# Patient Record
Sex: FEMALE | Race: WHITE | NOT HISPANIC OR LATINO | Employment: UNEMPLOYED | URBAN - METROPOLITAN AREA
[De-identification: names, ages, dates, MRNs, and addresses within clinical notes are randomized per-mention and may not be internally consistent; named-entity substitution may affect disease eponyms.]

---

## 2017-03-06 ENCOUNTER — HISTORICAL (OUTPATIENT)
Dept: LAB | Facility: HOSPITAL | Age: 37
End: 2017-03-06

## 2017-03-09 ENCOUNTER — HISTORICAL (OUTPATIENT)
Dept: RADIOLOGY | Facility: HOSPITAL | Age: 37
End: 2017-03-09

## 2017-03-13 ENCOUNTER — HISTORICAL (OUTPATIENT)
Dept: LAB | Facility: HOSPITAL | Age: 37
End: 2017-03-13

## 2017-09-13 ENCOUNTER — HISTORICAL (OUTPATIENT)
Dept: RADIOLOGY | Facility: HOSPITAL | Age: 37
End: 2017-09-13

## 2018-02-07 ENCOUNTER — HISTORICAL (OUTPATIENT)
Dept: LAB | Facility: HOSPITAL | Age: 38
End: 2018-02-07

## 2018-02-07 LAB
EST. AVERAGE GLUCOSE BLD GHB EST-MCNC: 134 MG/DL
HBA1C MFR BLD: 6.3 % (ref 4.5–6.2)

## 2018-05-09 ENCOUNTER — HISTORICAL (OUTPATIENT)
Dept: LAB | Facility: HOSPITAL | Age: 38
End: 2018-05-09

## 2018-05-09 LAB
EST. AVERAGE GLUCOSE BLD GHB EST-MCNC: 131 MG/DL
HBA1C MFR BLD: 6.2 % (ref 4.5–6.2)

## 2018-08-08 ENCOUNTER — HISTORICAL (OUTPATIENT)
Dept: LAB | Facility: HOSPITAL | Age: 38
End: 2018-08-08

## 2018-08-08 LAB
EST. AVERAGE GLUCOSE BLD GHB EST-MCNC: 120 MG/DL
HBA1C MFR BLD: 5.8 % (ref 4.5–6.2)

## 2020-02-18 ENCOUNTER — HISTORICAL (OUTPATIENT)
Dept: LAB | Facility: HOSPITAL | Age: 40
End: 2020-02-18

## 2020-02-18 LAB
ABS NEUT (OLG): 4.19 X10(3)/MCL (ref 2.1–9.2)
ALBUMIN SERPL-MCNC: 3.7 GM/DL (ref 3.4–5)
ALBUMIN/GLOB SERPL: 1.1 RATIO (ref 1.1–2)
ALP SERPL-CCNC: 75 UNIT/L (ref 46–116)
ALT SERPL-CCNC: 30 UNIT/L (ref 12–78)
AST SERPL-CCNC: 20 UNIT/L (ref 15–37)
BASOPHILS # BLD AUTO: 0 X10(3)/MCL (ref 0–0.2)
BASOPHILS NFR BLD AUTO: 1 %
BILIRUB SERPL-MCNC: 0.3 MG/DL (ref 0.2–1)
BILIRUBIN DIRECT+TOT PNL SERPL-MCNC: 0.11 MG/DL (ref 0–0.2)
BILIRUBIN DIRECT+TOT PNL SERPL-MCNC: 0.19 MG/DL (ref 0–0.8)
BUN SERPL-MCNC: 19 MG/DL (ref 7–18)
CALCIUM SERPL-MCNC: 9.1 MG/DL (ref 8.5–10.1)
CHLORIDE SERPL-SCNC: 101 MMOL/L (ref 98–107)
CHOLEST SERPL-MCNC: 199 MG/DL (ref 0–200)
CHOLEST/HDLC SERPL: 5.4 {RATIO} (ref 0–4)
CO2 SERPL-SCNC: 27.4 MMOL/L (ref 21–32)
CREAT SERPL-MCNC: 0.76 MG/DL (ref 0.6–1.3)
EOSINOPHIL # BLD AUTO: 0 X10(3)/MCL (ref 0–0.9)
EOSINOPHIL NFR BLD AUTO: 0 %
ERYTHROCYTE [DISTWIDTH] IN BLOOD BY AUTOMATED COUNT: 13.9 % (ref 11.5–17)
EST. AVERAGE GLUCOSE BLD GHB EST-MCNC: 131 MG/DL
GLOBULIN SER-MCNC: 3.4 GM/DL (ref 2.4–3.5)
GLUCOSE SERPL-MCNC: 98 MG/DL (ref 74–106)
HBA1C MFR BLD: 6.2 % (ref 4.5–6.2)
HCT VFR BLD AUTO: 40.6 % (ref 37–47)
HDLC SERPL-MCNC: 37 MG/DL (ref 40–60)
HGB BLD-MCNC: 13.2 GM/DL (ref 12–16)
IMM GRANULOCYTES # BLD AUTO: 0.02 % (ref 0–0.02)
IMM GRANULOCYTES NFR BLD AUTO: 0.3 % (ref 0–0.43)
LDLC SERPL CALC-MCNC: 133 MG/DL (ref 0–129)
LYMPHOCYTES # BLD AUTO: 2.4 X10(3)/MCL (ref 0.6–4.6)
LYMPHOCYTES NFR BLD AUTO: 33 %
MCH RBC QN AUTO: 26.7 PG (ref 27–31)
MCHC RBC AUTO-ENTMCNC: 32.5 GM/DL (ref 33–36)
MCV RBC AUTO: 82.2 FL (ref 80–94)
MONOCYTES # BLD AUTO: 0.6 X10(3)/MCL (ref 0.1–1.3)
MONOCYTES NFR BLD AUTO: 9 %
NEUTROPHILS # BLD AUTO: 4.19 X10(3)/MCL (ref 1.4–7.9)
NEUTROPHILS NFR BLD AUTO: 57 %
PLATELET # BLD AUTO: 325 X10(3)/MCL (ref 130–400)
PMV BLD AUTO: 9.3 FL (ref 9.4–12.4)
POTASSIUM SERPL-SCNC: 4.7 MMOL/L (ref 3.5–5.1)
PROT SERPL-MCNC: 7.1 GM/DL (ref 6.4–8.2)
RBC # BLD AUTO: 4.94 X10(6)/MCL (ref 4.2–5.4)
SODIUM SERPL-SCNC: 137 MMOL/L (ref 136–145)
TRIGL SERPL-MCNC: 145 MG/DL
TSH SERPL-ACNC: 3.17 MIU/ML (ref 0.36–3.74)
VLDLC SERPL CALC-MCNC: 29 MG/DL
WBC # SPEC AUTO: 7.4 X10(3)/MCL (ref 4.5–11.5)

## 2022-04-11 ENCOUNTER — HISTORICAL (OUTPATIENT)
Dept: ADMINISTRATIVE | Facility: HOSPITAL | Age: 42
End: 2022-04-11

## 2022-04-25 VITALS
BODY MASS INDEX: 40.74 KG/M2 | WEIGHT: 229.94 LBS | HEIGHT: 63 IN | DIASTOLIC BLOOD PRESSURE: 81 MMHG | OXYGEN SATURATION: 97 % | SYSTOLIC BLOOD PRESSURE: 119 MMHG

## 2023-08-17 ENCOUNTER — OFFICE VISIT (OUTPATIENT)
Dept: FAMILY MEDICINE | Facility: CLINIC | Age: 43
End: 2023-08-17
Payer: COMMERCIAL

## 2023-08-17 VITALS
RESPIRATION RATE: 16 BRPM | TEMPERATURE: 98 F | HEIGHT: 60 IN | DIASTOLIC BLOOD PRESSURE: 82 MMHG | WEIGHT: 220.81 LBS | OXYGEN SATURATION: 98 % | SYSTOLIC BLOOD PRESSURE: 119 MMHG | HEART RATE: 77 BPM | BODY MASS INDEX: 43.35 KG/M2

## 2023-08-17 DIAGNOSIS — Z00.00 ANNUAL PHYSICAL EXAM: ICD-10-CM

## 2023-08-17 DIAGNOSIS — Z12.31 BREAST CANCER SCREENING BY MAMMOGRAM: ICD-10-CM

## 2023-08-17 DIAGNOSIS — Z11.4 SCREENING FOR HIV (HUMAN IMMUNODEFICIENCY VIRUS): ICD-10-CM

## 2023-08-17 DIAGNOSIS — F32.A DEPRESSION, UNSPECIFIED DEPRESSION TYPE: ICD-10-CM

## 2023-08-17 DIAGNOSIS — Z76.89 ENCOUNTER TO ESTABLISH CARE: Primary | ICD-10-CM

## 2023-08-17 DIAGNOSIS — M77.11 LATERAL EPICONDYLITIS OF RIGHT ELBOW: ICD-10-CM

## 2023-08-17 DIAGNOSIS — G47.19 EXCESSIVE DAYTIME SLEEPINESS: ICD-10-CM

## 2023-08-17 DIAGNOSIS — R73.03 PREDIABETES: ICD-10-CM

## 2023-08-17 DIAGNOSIS — Z11.59 NEED FOR HEPATITIS C SCREENING TEST: ICD-10-CM

## 2023-08-17 DIAGNOSIS — F90.9 ATTENTION DEFICIT HYPERACTIVITY DISORDER (ADHD), UNSPECIFIED ADHD TYPE: ICD-10-CM

## 2023-08-17 DIAGNOSIS — R06.83 SNORES: ICD-10-CM

## 2023-08-17 DIAGNOSIS — R79.89 ABNORMAL LEVEL OF FEMALE SEX HORMONES: ICD-10-CM

## 2023-08-17 DIAGNOSIS — E66.01 MORBID OBESITY: ICD-10-CM

## 2023-08-17 PROBLEM — E66.9 OBESITY: Status: ACTIVE | Noted: 2023-08-17

## 2023-08-17 PROCEDURE — 3074F PR MOST RECENT SYSTOLIC BLOOD PRESSURE < 130 MM HG: ICD-10-PCS | Mod: CPTII,,, | Performed by: NURSE PRACTITIONER

## 2023-08-17 PROCEDURE — 1159F PR MEDICATION LIST DOCUMENTED IN MEDICAL RECORD: ICD-10-PCS | Mod: CPTII,,, | Performed by: NURSE PRACTITIONER

## 2023-08-17 PROCEDURE — 3008F PR BODY MASS INDEX (BMI) DOCUMENTED: ICD-10-PCS | Mod: CPTII,,, | Performed by: NURSE PRACTITIONER

## 2023-08-17 PROCEDURE — 3008F BODY MASS INDEX DOCD: CPT | Mod: CPTII,,, | Performed by: NURSE PRACTITIONER

## 2023-08-17 PROCEDURE — 1159F MED LIST DOCD IN RCRD: CPT | Mod: CPTII,,, | Performed by: NURSE PRACTITIONER

## 2023-08-17 PROCEDURE — 3079F PR MOST RECENT DIASTOLIC BLOOD PRESSURE 80-89 MM HG: ICD-10-PCS | Mod: CPTII,,, | Performed by: NURSE PRACTITIONER

## 2023-08-17 PROCEDURE — 99204 OFFICE O/P NEW MOD 45 MIN: CPT | Mod: ,,, | Performed by: NURSE PRACTITIONER

## 2023-08-17 PROCEDURE — 99204 PR OFFICE/OUTPT VISIT, NEW, LEVL IV, 45-59 MIN: ICD-10-PCS | Mod: ,,, | Performed by: NURSE PRACTITIONER

## 2023-08-17 PROCEDURE — 3079F DIAST BP 80-89 MM HG: CPT | Mod: CPTII,,, | Performed by: NURSE PRACTITIONER

## 2023-08-17 PROCEDURE — 1160F RVW MEDS BY RX/DR IN RCRD: CPT | Mod: CPTII,,, | Performed by: NURSE PRACTITIONER

## 2023-08-17 PROCEDURE — 1160F PR REVIEW ALL MEDS BY PRESCRIBER/CLIN PHARMACIST DOCUMENTED: ICD-10-PCS | Mod: CPTII,,, | Performed by: NURSE PRACTITIONER

## 2023-08-17 PROCEDURE — 3074F SYST BP LT 130 MM HG: CPT | Mod: CPTII,,, | Performed by: NURSE PRACTITIONER

## 2023-08-17 RX ORDER — IBUPROFEN 800 MG/1
800 TABLET ORAL 3 TIMES DAILY
Qty: 90 TABLET | Refills: 3 | Status: SHIPPED | OUTPATIENT
Start: 2023-08-17 | End: 2023-08-29

## 2023-08-17 RX ORDER — IBUPROFEN 200 MG
600 TABLET ORAL 2 TIMES DAILY
COMMUNITY
End: 2023-08-17

## 2023-08-17 NOTE — ASSESSMENT & PLAN NOTE
Baseline screening mammogram ordered to be completed at [facility] next week, follow-up next week for wellness and results.

## 2023-08-17 NOTE — PROGRESS NOTES
Subjective:       Patient ID: Kathryn Scruggs is a 42 y.o. female.    Chief Complaint: Establish Care and Arm Pain (Pain to R elbow for about 1 month that is getting worse. Pain is radiating to hand and shoulder now. Not d/t injury.)      HPI   This is a 42-year-old white female who presents to clinic today to establish care.  Patient reports past medical history of ADHD and depression but is no longer on medication.  States she was also prediabetic but has not been to the doctor in a few years.  Has a complaint today of right elbow pain.  States that it started 6-8 weeks ago but has gotten severe over the last month.  Hurts anytime she tries to use that hand/arm.  Also complains of excessive daytime sleepiness, fatigue.  States she just feels exhausted all the time no matter how much she sleeps.  Does report that she snores and 1 of her friends told her that she stops breathing while she is sleeping.  Review of Systems  Comprehensive review of systems negative except as stated in HPI    The patient's Health Maintenance was reviewed and the following appears to be due:   Health Maintenance Due   Topic Date Due    Hepatitis C Screening  Never done    HIV Screening  Never done    TETANUS VACCINE  Never done    Mammogram  Never done    Hemoglobin A1c (Diabetic Prevention Screening)  2023       Past Medical History:  Past Medical History:   Diagnosis Date    ADHD (attention deficit hyperactivity disorder)     Depression     Pre-diabetes      Past Surgical History:   Procedure Laterality Date     SECTION  2003     SECTION  2010     Review of patient's allergies indicates:   Allergen Reactions    Bacitracin Rash     rash      Cortisone Rash     rash    Dexamethasone Rash    Neomycin-bacitracin-polymyxin Rash     rash       Current Outpatient Medications on File Prior to Visit   Medication Sig Dispense Refill    [DISCONTINUED] ibuprofen (ADVIL) 200 MG tablet Take 600 mg by mouth 2 (two)  times a day.       No current facility-administered medications on file prior to visit.     Social History     Socioeconomic History    Marital status:    Tobacco Use    Smoking status: Former     Current packs/day: 0.00     Types: Cigarettes     Start date:      Quit date:      Years since quittin.6    Smokeless tobacco: Never   Substance and Sexual Activity    Alcohol use: Yes     Comment: occasional    Drug use: Not Currently     Types: Marijuana    Sexual activity: Yes     Partners: Male     Family History   Problem Relation Age of Onset    No Known Problems Mother        Objective:       /82 (BP Location: Left arm)   Pulse 77   Temp 98.4 °F (36.9 °C) (Temporal)   Resp 16   Ht 5' (1.524 m)   Wt 100.2 kg (220 lb 12.8 oz)   LMP 2023 (Exact Date)   SpO2 98%   BMI 43.12 kg/m²      Physical Exam  Vitals and nursing note reviewed.   Constitutional:       Appearance: Normal appearance. She is obese.   HENT:      Head: Normocephalic and atraumatic.      Right Ear: Tympanic membrane, ear canal and external ear normal.      Left Ear: Tympanic membrane, ear canal and external ear normal.      Nose: Nose normal.      Mouth/Throat:      Mouth: Mucous membranes are moist.      Pharynx: Oropharynx is clear.   Eyes:      Extraocular Movements: Extraocular movements intact.      Conjunctiva/sclera: Conjunctivae normal.      Pupils: Pupils are equal, round, and reactive to light.   Cardiovascular:      Rate and Rhythm: Normal rate and regular rhythm.      Heart sounds: Normal heart sounds.   Pulmonary:      Effort: Pulmonary effort is normal.      Breath sounds: Normal breath sounds.   Musculoskeletal:         General: Normal range of motion.      Right elbow: Tenderness present in lateral epicondyle.      Cervical back: Normal range of motion and neck supple.   Skin:     General: Skin is warm and dry.   Neurological:      General: No focal deficit present.      Mental Status: She is alert  and oriented to person, place, and time.   Psychiatric:         Mood and Affect: Mood normal.         Behavior: Behavior normal.         Thought Content: Thought content normal.         Judgment: Judgment normal.             Assessment and Plan       ICD-10-CM ICD-9-CM   1. Encounter to establish care  Z76.89 V65.8   2. Lateral epicondylitis of right elbow  M77.11 726.32   3. Attention deficit hyperactivity disorder (ADHD), unspecified ADHD type  F90.9 314.01   4. Depression, unspecified depression type  F32.A 311   5. Breast cancer screening by mammogram  Z12.31 V76.12   6. Annual physical exam  Z00.00 V70.0   7. Need for hepatitis C screening test  Z11.59 V73.89   8. Screening for HIV (human immunodeficiency virus)  Z11.4 V73.89   9. Abnormal level of female sex hormones  R87.1 796.4   10. Prediabetes  R73.03 790.29   11. Morbid obesity  E66.01 278.01   12. Snores  R06.83 786.09   13. Excessive daytime sleepiness  G47.19 780.54        1. Encounter to establish care    2. Lateral epicondylitis of right elbow  Assessment & Plan:  Start ibuprofen 800 mg 3 times daily.  Take Pepcid daily.  Refer to Dr. Cortez.    Orders:  -     ibuprofen (ADVIL,MOTRIN) 800 MG tablet; Take 1 tablet (800 mg total) by mouth 3 (three) times daily.  Dispense: 90 tablet; Refill: 3  -     Ambulatory referral/consult to Orthopedics; Future; Expected date: 08/24/2023    3. Attention deficit hyperactivity disorder (ADHD), unspecified ADHD type  Overview:  Diagnosed as an adult   Previously on Adderall IR, Adderall XR but had mood swings on both     Assessment & Plan:  Adderall worked well for her but gave her significant side effects with mood.  Not interested in starting something new at this time.      4. Depression, unspecified depression type  Overview:  Diagnosed in high school  Previously on Zoloft     Assessment & Plan:  Has been off of Zoloft now for a while and states doing well.      5. Breast cancer screening by  mammogram  Assessment & Plan:  Baseline screening mammogram ordered to be completed at [facility] next week, follow-up next week for wellness and results.    Orders:  -     Mammo Digital Screening Bilat w/ Jason; Future; Expected date: 08/17/2023    6. Annual physical exam  -     CBC Auto Differential; Future; Expected date: 08/17/2023  -     Comprehensive Metabolic Panel; Future; Expected date: 08/17/2023  -     Lipid Panel; Future; Expected date: 08/17/2023  -     TSH; Future; Expected date: 08/17/2023  -     Hemoglobin A1C; Future; Expected date: 08/17/2023    7. Need for hepatitis C screening test  -     Hepatitis C Antibody; Future; Expected date: 08/17/2023    8. Screening for HIV (human immunodeficiency virus)  -     HIV 1/2 Ag/Ab (4th Gen); Future; Expected date: 08/17/2023    9. Abnormal level of female sex hormones  -     Estradiol; Future; Expected date: 08/17/2023  -     Luteinizing Hormone; Future; Expected date: 08/17/2023  -     Progesterone; Future; Expected date: 08/17/2023  -     Follicle Stimulating Hormone; Future; Expected date: 08/17/2023  -     Testosterone; Future; Expected date: 08/17/2023    10. Prediabetes  Assessment & Plan:  Hemoglobin A1c 6.2 previously in 2020.  No labs since then.  Labs next week, follow-up at the end of the week for results.      11. Morbid obesity  Assessment & Plan:  Looks like insurance does cover Wegovy but patient may also be a new onset type 2 diabetic, will get labs and follow-up in clinic next week.      12. Snores  Assessment & Plan:  Home sleep study ordered with home Sleep delivered.      13. Excessive daytime sleepiness  Assessment & Plan:  Home sleep study ordered with home Sleep delivered.             Follow up in about 1 week (around 8/24/2023).

## 2023-08-17 NOTE — ASSESSMENT & PLAN NOTE
Adderall worked well for her but gave her significant side effects with mood.  Not interested in starting something new at this time.

## 2023-08-17 NOTE — ASSESSMENT & PLAN NOTE
Hemoglobin A1c 6.2 previously in 2020.  No labs since then.  Labs next week, follow-up at the end of the week for results.

## 2023-08-17 NOTE — ASSESSMENT & PLAN NOTE
Looks like insurance does cover Wegovy but patient may also be a new onset type 2 diabetic, will get labs and follow-up in clinic next week.

## 2023-08-21 ENCOUNTER — HOSPITAL ENCOUNTER (OUTPATIENT)
Dept: RADIOLOGY | Facility: HOSPITAL | Age: 43
Discharge: HOME | End: 2023-08-21
Attending: NURSE PRACTITIONER
Payer: COMMERCIAL

## 2023-08-21 DIAGNOSIS — Z12.31 BREAST CANCER SCREENING BY MAMMOGRAM: ICD-10-CM

## 2023-08-21 PROCEDURE — 77067 SCR MAMMO BI INCL CAD: CPT | Mod: 26,,, | Performed by: STUDENT IN AN ORGANIZED HEALTH CARE EDUCATION/TRAINING PROGRAM

## 2023-08-21 PROCEDURE — 77067 SCR MAMMO BI INCL CAD: CPT | Mod: TC

## 2023-08-21 PROCEDURE — 77063 BREAST TOMOSYNTHESIS BI: CPT | Mod: 26,,, | Performed by: STUDENT IN AN ORGANIZED HEALTH CARE EDUCATION/TRAINING PROGRAM

## 2023-08-21 PROCEDURE — 77067 MAMMO DIGITAL SCREENING BILAT WITH TOMO: ICD-10-PCS | Mod: 26,,, | Performed by: STUDENT IN AN ORGANIZED HEALTH CARE EDUCATION/TRAINING PROGRAM

## 2023-08-21 PROCEDURE — 77063 MAMMO DIGITAL SCREENING BILAT WITH TOMO: ICD-10-PCS | Mod: 26,,, | Performed by: STUDENT IN AN ORGANIZED HEALTH CARE EDUCATION/TRAINING PROGRAM

## 2023-08-22 ENCOUNTER — TELEPHONE (OUTPATIENT)
Dept: FAMILY MEDICINE | Facility: CLINIC | Age: 43
End: 2023-08-22
Payer: COMMERCIAL

## 2023-08-22 NOTE — TELEPHONE ENCOUNTER
----- Message from QUINTON Vega sent at 8/22/2023  1:10 PM CDT -----  Mammogram normal, repeat in 1 year unless concerns.

## 2023-08-22 NOTE — TELEPHONE ENCOUNTER
Are there any outstanding tasks in patient's chart?  n    2. Do we have outstanding/pending referrals?  n    3. Has the patient been seen in an ER, Urgent Care, or admitted since last visit?    n  4. Has patient seen any other health care providers since last visit?  Dr. Cortez    5.  Has patient had any blood work or x-rays done since last visit?    Labs, mammogram and xray

## 2023-08-23 ENCOUNTER — OFFICE VISIT (OUTPATIENT)
Dept: ORTHOPEDICS | Facility: CLINIC | Age: 43
End: 2023-08-23
Payer: COMMERCIAL

## 2023-08-23 VITALS
WEIGHT: 220 LBS | SYSTOLIC BLOOD PRESSURE: 141 MMHG | HEART RATE: 85 BPM | HEIGHT: 60 IN | BODY MASS INDEX: 43.19 KG/M2 | DIASTOLIC BLOOD PRESSURE: 91 MMHG

## 2023-08-23 DIAGNOSIS — M77.11 LATERAL EPICONDYLITIS OF RIGHT ELBOW: ICD-10-CM

## 2023-08-23 DIAGNOSIS — M25.521 RIGHT ELBOW PAIN: Primary | ICD-10-CM

## 2023-08-23 PROCEDURE — 1159F MED LIST DOCD IN RCRD: CPT | Mod: CPTII,,, | Performed by: ORTHOPAEDIC SURGERY

## 2023-08-23 PROCEDURE — 3080F PR MOST RECENT DIASTOLIC BLOOD PRESSURE >= 90 MM HG: ICD-10-PCS | Mod: CPTII,,, | Performed by: ORTHOPAEDIC SURGERY

## 2023-08-23 PROCEDURE — 1160F PR REVIEW ALL MEDS BY PRESCRIBER/CLIN PHARMACIST DOCUMENTED: ICD-10-PCS | Mod: CPTII,,, | Performed by: ORTHOPAEDIC SURGERY

## 2023-08-23 PROCEDURE — 3077F PR MOST RECENT SYSTOLIC BLOOD PRESSURE >= 140 MM HG: ICD-10-PCS | Mod: CPTII,,, | Performed by: ORTHOPAEDIC SURGERY

## 2023-08-23 PROCEDURE — 3008F BODY MASS INDEX DOCD: CPT | Mod: CPTII,,, | Performed by: ORTHOPAEDIC SURGERY

## 2023-08-23 PROCEDURE — 99204 OFFICE O/P NEW MOD 45 MIN: CPT | Mod: ,,, | Performed by: ORTHOPAEDIC SURGERY

## 2023-08-23 PROCEDURE — 1160F RVW MEDS BY RX/DR IN RCRD: CPT | Mod: CPTII,,, | Performed by: ORTHOPAEDIC SURGERY

## 2023-08-23 PROCEDURE — 3051F HG A1C>EQUAL 7.0%<8.0%: CPT | Mod: CPTII,,, | Performed by: ORTHOPAEDIC SURGERY

## 2023-08-23 PROCEDURE — 3080F DIAST BP >= 90 MM HG: CPT | Mod: CPTII,,, | Performed by: ORTHOPAEDIC SURGERY

## 2023-08-23 PROCEDURE — 1159F PR MEDICATION LIST DOCUMENTED IN MEDICAL RECORD: ICD-10-PCS | Mod: CPTII,,, | Performed by: ORTHOPAEDIC SURGERY

## 2023-08-23 PROCEDURE — 3077F SYST BP >= 140 MM HG: CPT | Mod: CPTII,,, | Performed by: ORTHOPAEDIC SURGERY

## 2023-08-23 PROCEDURE — 99204 PR OFFICE/OUTPT VISIT, NEW, LEVL IV, 45-59 MIN: ICD-10-PCS | Mod: ,,, | Performed by: ORTHOPAEDIC SURGERY

## 2023-08-23 PROCEDURE — 3008F PR BODY MASS INDEX (BMI) DOCUMENTED: ICD-10-PCS | Mod: CPTII,,, | Performed by: ORTHOPAEDIC SURGERY

## 2023-08-23 PROCEDURE — 3051F PR MOST RECENT HEMOGLOBIN A1C LEVEL 7.0 - < 8.0%: ICD-10-PCS | Mod: CPTII,,, | Performed by: ORTHOPAEDIC SURGERY

## 2023-08-23 RX ORDER — MELOXICAM 15 MG/1
15 TABLET ORAL DAILY
Qty: 30 TABLET | Refills: 0 | Status: SHIPPED | OUTPATIENT
Start: 2023-08-23 | End: 2023-11-30

## 2023-08-23 NOTE — PROGRESS NOTES
Subjective:    CC: Pain of the Right Elbow and Pain (R elbow pain - pt states that she has pain in her elbow the more she uses her hand. she is right handed. she states that spraying anything is painful. she states that she has difficulty lifting things. pt did take ibu 800)       HPI:  Patient comes in today for 1st visit.  Patient complains of right elbow pain.  Patient states over the last month she has severe pain, going from the outside part of her elbow down into her hand.  She denies trauma or specific injury.  She denies any swelling, she is tried some ibuprofen without relief.  She does have a history of a severe reaction with a previous steroid injection many years ago.    ROS: Refer to HPI for pertinent ROS. All other 12 point systems negative.    Objective:  Vitals:    08/23/23 1304   BP: (!) 141/91   Pulse: 85   Weight: 99.8 kg (220 lb)   Height: 5' (1.524 m)        Physical Exam:  Patient is well-nourished developed female she is awake alert and oriented x3 she is in no apparent stress is pleasant and cooperative.  Examination of the right upper extremity compartment soft and warm.  Skin is intact.  There is no signs symptoms of DVT or infection.  Examination of the elbow he has some tenderness along the lateral epicondyle region.  She does have pain with resisted wrist extension.  She has a negative hook sign over the biceps, she is nontender medially.  Her motion is 0-120 degrees she has no pain with pronation supination, neurovascular intact distally.    Images:  X-rays two views right elbow demonstrate no obvious fracture or dislocation. Images Reviewed and discussed with patient.    Assessment:  1. Right elbow pain  - X-Ray Elbow Complete 3 views Right; Future    2. Lateral epicondylitis of right elbow  - Ambulatory referral/consult to Orthopedics        Plan:  At this time we discussed her physical exam and x-ray findings.  We will hold off on injection given her history of a previous reaction.   We have discussed a elbow strap, she was given a pamphlet on elbow  range of motion strengthening exercises we have discussed Mobic with appropriate precautions.  I would like see back in 4 weeks to see how she is progressing.    Follow UP: No follow-ups on file.

## 2023-08-29 ENCOUNTER — CLINICAL SUPPORT (OUTPATIENT)
Dept: FAMILY MEDICINE | Facility: CLINIC | Age: 43
End: 2023-08-29
Attending: NURSE PRACTITIONER
Payer: COMMERCIAL

## 2023-08-29 ENCOUNTER — OFFICE VISIT (OUTPATIENT)
Dept: FAMILY MEDICINE | Facility: CLINIC | Age: 43
End: 2023-08-29
Payer: COMMERCIAL

## 2023-08-29 VITALS
SYSTOLIC BLOOD PRESSURE: 144 MMHG | RESPIRATION RATE: 16 BRPM | OXYGEN SATURATION: 99 % | TEMPERATURE: 98 F | HEART RATE: 91 BPM | HEIGHT: 60 IN | DIASTOLIC BLOOD PRESSURE: 98 MMHG | WEIGHT: 220.63 LBS | BODY MASS INDEX: 43.32 KG/M2

## 2023-08-29 DIAGNOSIS — Z11.4 SCREENING FOR HIV (HUMAN IMMUNODEFICIENCY VIRUS): ICD-10-CM

## 2023-08-29 DIAGNOSIS — E11.9 TYPE 2 DIABETES MELLITUS WITHOUT COMPLICATION, WITHOUT LONG-TERM CURRENT USE OF INSULIN: ICD-10-CM

## 2023-08-29 DIAGNOSIS — Z00.00 ANNUAL PHYSICAL EXAM: Primary | ICD-10-CM

## 2023-08-29 DIAGNOSIS — E78.2 MIXED HYPERLIPIDEMIA: ICD-10-CM

## 2023-08-29 DIAGNOSIS — R06.83 SNORES: ICD-10-CM

## 2023-08-29 DIAGNOSIS — E66.01 MORBID OBESITY: ICD-10-CM

## 2023-08-29 DIAGNOSIS — M77.11 LATERAL EPICONDYLITIS OF RIGHT ELBOW: ICD-10-CM

## 2023-08-29 DIAGNOSIS — I10 PRIMARY HYPERTENSION: ICD-10-CM

## 2023-08-29 DIAGNOSIS — G47.19 EXCESSIVE DAYTIME SLEEPINESS: ICD-10-CM

## 2023-08-29 DIAGNOSIS — Z13.6 ENCOUNTER FOR SCREENING FOR CARDIOVASCULAR DISORDERS: ICD-10-CM

## 2023-08-29 DIAGNOSIS — Z12.31 BREAST CANCER SCREENING BY MAMMOGRAM: ICD-10-CM

## 2023-08-29 DIAGNOSIS — Z11.59 NEED FOR HEPATITIS C SCREENING TEST: ICD-10-CM

## 2023-08-29 PROCEDURE — 1159F PR MEDICATION LIST DOCUMENTED IN MEDICAL RECORD: ICD-10-PCS | Mod: CPTII,,, | Performed by: NURSE PRACTITIONER

## 2023-08-29 PROCEDURE — 4010F ACE/ARB THERAPY RXD/TAKEN: CPT | Mod: CPTII,,, | Performed by: NURSE PRACTITIONER

## 2023-08-29 PROCEDURE — 92228 IMG RTA DETC/MNTR DS PHY/QHP: CPT | Mod: TC,,, | Performed by: INTERNAL MEDICINE

## 2023-08-29 PROCEDURE — 3077F PR MOST RECENT SYSTOLIC BLOOD PRESSURE >= 140 MM HG: ICD-10-PCS | Mod: CPTII,,, | Performed by: NURSE PRACTITIONER

## 2023-08-29 PROCEDURE — 3008F PR BODY MASS INDEX (BMI) DOCUMENTED: ICD-10-PCS | Mod: CPTII,,, | Performed by: NURSE PRACTITIONER

## 2023-08-29 PROCEDURE — 2025F DIABETIC EYE SCREENING PHOTO: ICD-10-PCS | Mod: CPTII,,, | Performed by: OPTOMETRIST

## 2023-08-29 PROCEDURE — 3008F BODY MASS INDEX DOCD: CPT | Mod: CPTII,,, | Performed by: NURSE PRACTITIONER

## 2023-08-29 PROCEDURE — 3051F HG A1C>EQUAL 7.0%<8.0%: CPT | Mod: CPTII,,, | Performed by: NURSE PRACTITIONER

## 2023-08-29 PROCEDURE — 3051F PR MOST RECENT HEMOGLOBIN A1C LEVEL 7.0 - < 8.0%: ICD-10-PCS | Mod: CPTII,,, | Performed by: NURSE PRACTITIONER

## 2023-08-29 PROCEDURE — 3080F PR MOST RECENT DIASTOLIC BLOOD PRESSURE >= 90 MM HG: ICD-10-PCS | Mod: CPTII,,, | Performed by: NURSE PRACTITIONER

## 2023-08-29 PROCEDURE — 2025F 7 FLD RTA PHOTO W/O RTNOPTHY: CPT | Mod: CPTII,,, | Performed by: OPTOMETRIST

## 2023-08-29 PROCEDURE — 3077F SYST BP >= 140 MM HG: CPT | Mod: CPTII,,, | Performed by: NURSE PRACTITIONER

## 2023-08-29 PROCEDURE — 4010F PR ACE/ARB THEARPY RXD/TAKEN: ICD-10-PCS | Mod: CPTII,,, | Performed by: NURSE PRACTITIONER

## 2023-08-29 PROCEDURE — 99396 PREV VISIT EST AGE 40-64: CPT | Mod: ,,, | Performed by: NURSE PRACTITIONER

## 2023-08-29 PROCEDURE — 92228 DIABETIC EYE SCREENING PHOTO: ICD-10-PCS | Mod: 26,,, | Performed by: OPTOMETRIST

## 2023-08-29 PROCEDURE — 92228 PR REMOTE IMAGE RETINA, MONITOR/MANAGE ACTIVE DISEASE: ICD-10-PCS | Mod: TC,,, | Performed by: INTERNAL MEDICINE

## 2023-08-29 PROCEDURE — 3080F DIAST BP >= 90 MM HG: CPT | Mod: CPTII,,, | Performed by: NURSE PRACTITIONER

## 2023-08-29 PROCEDURE — 1160F PR REVIEW ALL MEDS BY PRESCRIBER/CLIN PHARMACIST DOCUMENTED: ICD-10-PCS | Mod: CPTII,,, | Performed by: NURSE PRACTITIONER

## 2023-08-29 PROCEDURE — 82043 UR ALBUMIN QUANTITATIVE: CPT | Performed by: NURSE PRACTITIONER

## 2023-08-29 PROCEDURE — 92228 IMG RTA DETC/MNTR DS PHY/QHP: CPT | Mod: 26,,, | Performed by: OPTOMETRIST

## 2023-08-29 PROCEDURE — 1159F MED LIST DOCD IN RCRD: CPT | Mod: CPTII,,, | Performed by: NURSE PRACTITIONER

## 2023-08-29 PROCEDURE — 1160F RVW MEDS BY RX/DR IN RCRD: CPT | Mod: CPTII,,, | Performed by: NURSE PRACTITIONER

## 2023-08-29 PROCEDURE — 99396 PR PREVENTIVE VISIT,EST,40-64: ICD-10-PCS | Mod: ,,, | Performed by: NURSE PRACTITIONER

## 2023-08-29 RX ORDER — SEMAGLUTIDE 1.34 MG/ML
1 INJECTION, SOLUTION SUBCUTANEOUS
Qty: 3 ML | Refills: 11 | Status: SHIPPED | OUTPATIENT
Start: 2023-08-29 | End: 2023-10-09 | Stop reason: SDUPTHER

## 2023-08-29 RX ORDER — LISINOPRIL 2.5 MG/1
2.5 TABLET ORAL DAILY
Qty: 90 TABLET | Refills: 3 | Status: SHIPPED | OUTPATIENT
Start: 2023-08-29 | End: 2024-03-01 | Stop reason: SDUPTHER

## 2023-08-29 NOTE — PROGRESS NOTES
Kathryn Scruggs is a 42 y.o. female here for a diabetic eye screening with non-dilated fundus photos per QUINTON Jones.    Patient cooperative?: Yes  Small pupils?: No  Last eye exam: Unknown    For exam results, see Encounter Report.

## 2023-08-29 NOTE — ASSESSMENT & PLAN NOTE
Patient did see Dr. Cortez, started meloxicam.  Instructed to discontinue the ibuprofen while on the meloxicam.  Instructed follow-up with Dr. Cortez as scheduled.

## 2023-08-29 NOTE — ASSESSMENT & PLAN NOTE
Patient reports she did complete the sleep study, we do not have results yet.  Will call with results.

## 2023-08-29 NOTE — ASSESSMENT & PLAN NOTE
Patient starting Ozempic today for new onset type 2 diabetes.  Discussed with patient this will also help with weight loss.  Instructed to follow low-calorie diet and increase exercise.  Follow-up 3 months.

## 2023-08-29 NOTE — ASSESSMENT & PLAN NOTE
Hemoglobin A1c 7.2.  Explained to patient that she is now type 2 diabetic.  She was previously a prediabetic with hemoglobin A1c of 6.2 back in 2020.  Start Ozempic 0.25 mg weekly for 2 weeks then increase to 0.5 mg weekly.  Patient given sample pen and instructed on home use.  Prescription sent to pharmacy for Ozempic 1 mg weekly to start in 4-6 weeks after she completes sample pen.  Urine collected for microalbumin today.  Foot exam and in office redness screening photo completed today.  Discussed starting statin therapy at her next visit.  Discussed diabetic diet.  Return to clinic in 3 months with hemoglobin A1c in office.

## 2023-08-29 NOTE — ASSESSMENT & PLAN NOTE
Total cholesterol 214, HDL 34, triglycerides 178, .  Total cholesterol to HDL ratio 6.  Will start patient on statin therapy in 3 months.  She is starting on blood pressure medication as well as diabetes medication today.  Plan to add rosuvastatin 10 mg daily in 3 months.  Will recheck lipid panel in 6 months.  CT calcium score ordered today, will call with results.

## 2023-08-29 NOTE — PROGRESS NOTES
Subjective:       Patient ID: Kathryn Scruggs is a 42 y.o. female.    Chief Complaint: Annual Exam      HPI   This is a 42-year-old white female who presents to clinic today for an annual wellness exam.  Patient reports that she did see Dr. Cortez for her elbow pain and he put her on meloxicam daily.  She is still having severe pain in her elbow that is now radiating down to her wrist and up to her shoulder.  Review of Systems  Comprehensive review of systems negative except as stated in HPI    The patient's Health Maintenance was reviewed and the following appears to be due:   There are no preventive care reminders to display for this patient.    Past Medical History:  Past Medical History:   Diagnosis Date    ADHD (attention deficit hyperactivity disorder)     Depression     Pre-diabetes      Past Surgical History:   Procedure Laterality Date     SECTION  2003     SECTION  2010     Review of patient's allergies indicates:   Allergen Reactions    Bacitracin Rash     rash      Cortisone Rash     rash    Dexamethasone Rash    Neomycin-bacitracin-polymyxin Rash     rash       Current Outpatient Medications on File Prior to Visit   Medication Sig Dispense Refill    meloxicam (MOBIC) 15 MG tablet Take 1 tablet (15 mg total) by mouth once daily. 30 tablet 0    [DISCONTINUED] ibuprofen (ADVIL,MOTRIN) 800 MG tablet Take 1 tablet (800 mg total) by mouth 3 (three) times daily. 90 tablet 3     No current facility-administered medications on file prior to visit.     Social History     Socioeconomic History    Marital status:    Tobacco Use    Smoking status: Former     Types: Cigarettes     Start date:      Quit date:      Years since quittin.6    Smokeless tobacco: Never   Substance and Sexual Activity    Alcohol use: Yes     Comment: occasional    Drug use: Not Currently     Types: Marijuana    Sexual activity: Yes     Partners: Male     Family History   Problem Relation Age  of Onset    No Known Problems Mother        Objective:       BP (!) 144/98 (BP Location: Left arm)   Pulse 91   Temp 98.1 °F (36.7 °C) (Oral)   Resp 16   Ht 5' (1.524 m)   Wt 100.1 kg (220 lb 9.6 oz)   LMP 08/05/2023 (Exact Date)   SpO2 99%   BMI 43.08 kg/m²      Physical Exam  Constitutional:       Appearance: Normal appearance. She is obese.   HENT:      Head: Normocephalic and atraumatic.      Right Ear: Tympanic membrane, ear canal and external ear normal.      Left Ear: Tympanic membrane, ear canal and external ear normal.      Nose: Nose normal.      Mouth/Throat:      Mouth: Mucous membranes are moist.      Pharynx: Oropharynx is clear.   Eyes:      Extraocular Movements: Extraocular movements intact.      Conjunctiva/sclera: Conjunctivae normal.      Pupils: Pupils are equal, round, and reactive to light.   Cardiovascular:      Rate and Rhythm: Normal rate and regular rhythm.      Pulses: Normal pulses.           Dorsalis pedis pulses are 2+ on the right side and 2+ on the left side.        Posterior tibial pulses are 2+ on the right side and 2+ on the left side.      Heart sounds: Normal heart sounds.   Pulmonary:      Effort: Pulmonary effort is normal.      Breath sounds: Normal breath sounds.   Abdominal:      General: Abdomen is flat. Bowel sounds are normal.      Palpations: Abdomen is soft.   Musculoskeletal:         General: Normal range of motion.      Cervical back: Normal range of motion.   Feet:      Right foot:      Protective Sensation: 5 sites tested.  5 sites sensed.      Skin integrity: Skin integrity normal.      Toenail Condition: Right toenails are normal.      Left foot:      Protective Sensation: 5 sites tested.  5 sites sensed.      Skin integrity: Skin integrity normal.      Toenail Condition: Left toenails are normal.   Skin:     General: Skin is warm and dry.   Neurological:      General: No focal deficit present.      Mental Status: She is alert and oriented to person,  place, and time.   Psychiatric:         Mood and Affect: Mood normal.         Behavior: Behavior normal.         Thought Content: Thought content normal.         Judgment: Judgment normal.         Labs  Lab Visit on 08/21/2023   Component Date Value Ref Range Status    Sodium Level 08/21/2023 137  136 - 145 mmol/L Final    Potassium Level 08/21/2023 4.4  3.5 - 5.1 mmol/L Final    Chloride 08/21/2023 103  98 - 107 mmol/L Final    Carbon Dioxide 08/21/2023 28  22 - 29 mmol/L Final    Glucose Level 08/21/2023 156 (H)  74 - 100 mg/dL Final    Blood Urea Nitrogen 08/21/2023 11.9  7.0 - 18.7 mg/dL Final    Creatinine 08/21/2023 0.78  0.55 - 1.02 mg/dL Final    Calcium Level Total 08/21/2023 9.2  8.4 - 10.2 mg/dL Final    Protein Total 08/21/2023 7.1  6.4 - 8.3 gm/dL Final    Albumin Level 08/21/2023 3.6  3.5 - 5.0 g/dL Final    Globulin 08/21/2023 3.5  2.4 - 3.5 gm/dL Final    Albumin/Globulin Ratio 08/21/2023 1.0 (L)  1.1 - 2.0 ratio Final    Bilirubin Total 08/21/2023 0.3  <=1.5 mg/dL Final    Alkaline Phosphatase 08/21/2023 78  40 - 150 unit/L Final    Alanine Aminotransferase 08/21/2023 22  0 - 55 unit/L Final    Aspartate Aminotransferase 08/21/2023 16  5 - 34 unit/L Final    eGFR 08/21/2023 >60  mls/min/1.73/m2 Final    Cholesterol Total 08/21/2023 214 (H)  <=200 mg/dL Final    HDL Cholesterol 08/21/2023 34 (L)  35 - 60 mg/dL Final    Triglyceride 08/21/2023 178 (H)  37 - 140 mg/dL Final    Cholesterol/HDL Ratio 08/21/2023 6 (H)  0 - 5 Final    Very Low Density Lipoprotein 08/21/2023 36   Final    LDL Cholesterol 08/21/2023 144.00 (H)  50.00 - 140.00 mg/dL Final    Thyroid Stimulating Hormone 08/21/2023 3.797  0.350 - 4.940 uIU/mL Final    Hemoglobin A1c 08/21/2023 7.2 (H)  <=7.0 % Final    Estimated Average Glucose 08/21/2023 159.9  mg/dL Final    HIV 08/21/2023 Nonreactive  Nonreactive Final    Hep C Ab Interp 08/21/2023 Nonreactive  Nonreactive Final    Estradiol Level 08/21/2023 77  pg/mL Final    Estradiol  Expected Values:    Menstruating females:     Follicular phase    pg/ml     Mid-cycle phase    pg/ml     Luteal phase    pg/ml    Post-menopausal females:     On Menopausal Hormone Therapy (MHT) < pg/ml     Untreated    <10-28 pg/ml    Males    11-44 pg/ml      Luteinizing Hormone 08/21/2023 6.83  mIU/mL Final    Luteinizing Hormone Expected Values:    Normal Menstruating Females:    Follicular Phase:   1.8-11.78   Mid-cycle Phase:   7.59-89.08   Luteal Phase:   0.56-14.00    Postmenopausal Females:   Without HRT:   5.16-61.99    Males     0.57-12.07               Progesterone Level 08/21/2023 1.4  ng/mL Final    Progesterone Expected Values:  Males                                               <0.1-0.2 ng/mL    Normal Mentruating Females:  Follicular Phase                               <0.1-0.3 ng/mL  Luteal Phase                                    1.2-15.9 ng/mL    Postmenopausal Females:               <0.1-0.2 ng/mL    Pregnant Females:  First Trimester                                  2.8-147.3 ng/mL  Second Trimester                             22.5-95.3 ng/mL  Third Trimester                                27.9-242.5 ng/mL                   Follicle Stimulating Hormone 08/21/2023 7.92  mIU/mL Final    Follicle Stimulating Hormone Expected Values:      Normal Menstruating Females:  Follicular    3.03-8.08  Mid Cycle Peak  2.55-16.69  Luteal Phase  1.38-5.47    Postmenopausal Females: 26..41    Males   0.95 - 11.95        Testosterone Total 08/21/2023 27.99  13.84 - 53.35 ng/dL Final    WBC 08/21/2023 7.76  4.50 - 11.50 x10(3)/mcL Final    RBC 08/21/2023 4.90  4.20 - 5.40 x10(6)/mcL Final    Hgb 08/21/2023 12.6  12.0 - 16.0 g/dL Final    Hct 08/21/2023 40.1  37.0 - 47.0 % Final    MCV 08/21/2023 81.8  80.0 - 94.0 fL Final    MCH 08/21/2023 25.7 (L)  27.0 - 31.0 pg Final    MCHC 08/21/2023 31.4 (L)  33.0 - 36.0 g/dL Final    RDW 08/21/2023 13.4  11.5 - 17.0 % Final    Platelet  08/21/2023 287  130 - 400 x10(3)/mcL Final    MPV 08/21/2023 8.9  7.4 - 10.4 fL Final    Neut % 08/21/2023 62.2  % Final    Lymph % 08/21/2023 29.8  % Final    Mono % 08/21/2023 7.1  % Final    Eos % 08/21/2023 0.1  % Final    Basophil % 08/21/2023 0.4  % Final    Lymph # 08/21/2023 2.31  0.6 - 4.6 x10(3)/mcL Final    Neut # 08/21/2023 4.83  2.1 - 9.2 x10(3)/mcL Final    Mono # 08/21/2023 0.55  0.1 - 1.3 x10(3)/mcL Final    Eos # 08/21/2023 0.01  0 - 0.9 x10(3)/mcL Final    Baso # 08/21/2023 0.03  <=0.2 x10(3)/mcL Final    IG# 08/21/2023 0.03  0 - 0.04 x10(3)/mcL Final    IG% 08/21/2023 0.4  % Final       Assessment and Plan       ICD-10-CM ICD-9-CM   1. Annual physical exam  Z00.00 V70.0   2. Need for hepatitis C screening test  Z11.59 V73.89   3. Screening for HIV (human immunodeficiency virus)  Z11.4 V73.89   4. Breast cancer screening by mammogram  Z12.31 V76.12   5. Type 2 diabetes mellitus without complication, without long-term current use of insulin  E11.9 250.00   6. Morbid obesity  E66.01 278.01   7. Primary hypertension  I10 401.9   8. Encounter for screening for cardiovascular disorders  Z13.6 V81.2   9. Lateral epicondylitis of right elbow  M77.11 726.32   10. Snores  R06.83 786.09   11. Excessive daytime sleepiness  G47.19 780.54   12. Mixed hyperlipidemia  E78.2 272.2        1. Annual physical exam  Overview:  Annual exam yearly in August      2. Need for hepatitis C screening test  Comments:  Nonreactive    3. Screening for HIV (human immunodeficiency virus)  Comments:  Nonreactive    4. Breast cancer screening by mammogram  Overview:  MMG yearly in August    Assessment & Plan:  Recent mammogram normal, repeat 1 year.      5. Type 2 diabetes mellitus without complication, without long-term current use of insulin  Overview:  Diagnosed age 42 (August 2023), Hgb A1c 7.2    08/29/2023 - Ozempic 0.25 mg SQ weekly    Assessment & Plan:  Hemoglobin A1c 7.2.  Explained to patient that she is now type 2  diabetic.  She was previously a prediabetic with hemoglobin A1c of 6.2 back in 2020.  Start Ozempic 0.25 mg weekly for 2 weeks then increase to 0.5 mg weekly.  Patient given sample pen and instructed on home use.  Prescription sent to pharmacy for Ozempic 1 mg weekly to start in 4-6 weeks after she completes sample pen.  Urine collected for microalbumin today.  Foot exam and in office redness screening photo completed today.  Discussed starting statin therapy at her next visit.  Discussed diabetic diet.  Return to clinic in 3 months with hemoglobin A1c in office.    Orders:  -     semaglutide (OZEMPIC) 1 mg/dose (4 mg/3 mL); Inject 1 mg into the skin every 7 days.  Dispense: 3 mL; Refill: 11  -     Diabetic Eye Screening Photo; Future  -     Microalbumin/Creatinine Ratio, Urine    6. Morbid obesity  Overview:  08/29/2023 - 220 lb, BMI 43.08, start Ozempic    Assessment & Plan:  Patient starting Ozempic today for new onset type 2 diabetes.  Discussed with patient this will also help with weight loss.  Instructed to follow low-calorie diet and increase exercise.  Follow-up 3 months.      7. Primary hypertension  Overview:  08/29/2023 - lisinopril 2.5 mg daily    Assessment & Plan:  Start lisinopril 2.5 mg daily, follow-up 3 months.    Orders:  -     lisinopriL (PRINIVIL,ZESTRIL) 2.5 MG tablet; Take 1 tablet (2.5 mg total) by mouth once daily.  Dispense: 90 tablet; Refill: 3    8. Encounter for screening for cardiovascular disorders  -     CT Calcium Scoring Cardiac; Future; Expected date: 08/29/2023    9. Lateral epicondylitis of right elbow  Assessment & Plan:  Patient did see Dr. Cortez, started meloxicam.  Instructed to discontinue the ibuprofen while on the meloxicam.  Instructed follow-up with Dr. Cortez as scheduled.      10. Snores  Assessment & Plan:  Patient reports she did complete the sleep study, we do not have results yet.  Will call with results.      11. Excessive daytime sleepiness  Assessment &  Plan:  Patient reports she did complete the sleep study, we do not have results yet.  Will call with results.      12. Mixed hyperlipidemia  Assessment & Plan:  Total cholesterol 214, HDL 34, triglycerides 178, .  Total cholesterol to HDL ratio 6.  Will start patient on statin therapy in 3 months.  She is starting on blood pressure medication as well as diabetes medication today.  Plan to add rosuvastatin 10 mg daily in 3 months.  Will recheck lipid panel in 6 months.  CT calcium score ordered today, will call with results.             Follow up in about 3 months (around 11/29/2023) for follow up A1c in office .

## 2023-08-30 ENCOUNTER — TELEPHONE (OUTPATIENT)
Dept: FAMILY MEDICINE | Facility: CLINIC | Age: 43
End: 2023-08-30
Payer: COMMERCIAL

## 2023-08-30 LAB
CREAT UR-MCNC: 201.5 MG/DL (ref 45–106)
MICROALBUMIN UR-MCNC: 20.2 UG/ML
MICROALBUMIN/CREAT RATIO PNL UR: 10 MG/GM CR (ref 0–30)

## 2023-08-30 NOTE — TELEPHONE ENCOUNTER
----- Message from QUINTON Vega sent at 8/30/2023  7:51 AM CDT -----  Diabetic eye screening photos normal, no retinopathy.

## 2023-09-05 ENCOUNTER — HOSPITAL ENCOUNTER (OUTPATIENT)
Dept: RADIOLOGY | Facility: HOSPITAL | Age: 43
Discharge: HOME | End: 2023-09-05
Attending: NURSE PRACTITIONER
Payer: COMMERCIAL

## 2023-09-05 ENCOUNTER — TELEPHONE (OUTPATIENT)
Dept: FAMILY MEDICINE | Facility: CLINIC | Age: 43
End: 2023-09-05
Payer: COMMERCIAL

## 2023-09-05 DIAGNOSIS — Z13.6 ENCOUNTER FOR SCREENING FOR CARDIOVASCULAR DISORDERS: ICD-10-CM

## 2023-09-05 PROBLEM — R91.8 PULMONARY NODULES: Status: ACTIVE | Noted: 2023-09-05

## 2023-09-05 PROBLEM — K76.0 HEPATIC STEATOSIS: Status: ACTIVE | Noted: 2023-09-05

## 2023-09-05 PROCEDURE — 75571 CT HRT W/O DYE W/CA TEST: CPT | Mod: TC

## 2023-09-05 NOTE — PROGRESS NOTES
Coronary artery calcium score is 0.  This is great, very little coronary heart disease risk.  Incidentally noted was fatty liver.  Ways to help improve this are low triglyceride diet, weight loss, decreasing alcohol consumption.  Also incidentally noted were a few very tiny lung nodules.  These are of very low suspicion but since she is a former smoker, will repeat a CT scan of her chest in 1 year to ensure stability.

## 2023-09-05 NOTE — TELEPHONE ENCOUNTER
----- Message from QUINTON Vega sent at 9/5/2023 12:21 PM CDT -----  Coronary artery calcium score is 0.  This is great, very little coronary heart disease risk.  Incidentally noted was fatty liver.  Ways to help improve this are low triglyceride diet, weight loss, decreasing alcohol consumption.  Also incidentally noted were a few very tiny lung nodules.  These are of very low suspicion but since she is a former smoker, will repeat a CT scan of her chest in 1 year to ensure stability.

## 2023-09-07 ENCOUNTER — TELEPHONE (OUTPATIENT)
Dept: FAMILY MEDICINE | Facility: CLINIC | Age: 43
End: 2023-09-07
Payer: COMMERCIAL

## 2023-09-07 DIAGNOSIS — G47.33 OSA (OBSTRUCTIVE SLEEP APNEA): ICD-10-CM

## 2023-09-07 PROBLEM — R06.83 SNORES: Status: RESOLVED | Noted: 2023-08-17 | Resolved: 2023-09-07

## 2023-09-07 NOTE — TELEPHONE ENCOUNTER
Home sleep study reviewed, moderate sleep apnea noted.  I recommend auto PAP therapy, orders signed, please let patient know they will contact her to set it up.

## 2023-09-20 ENCOUNTER — OFFICE VISIT (OUTPATIENT)
Dept: ORTHOPEDICS | Facility: CLINIC | Age: 43
End: 2023-09-20
Payer: COMMERCIAL

## 2023-09-20 DIAGNOSIS — M77.11 LATERAL EPICONDYLITIS OF RIGHT ELBOW: Primary | ICD-10-CM

## 2023-09-20 PROCEDURE — 20605 DRAIN/INJ JOINT/BURSA W/O US: CPT | Mod: RT,,, | Performed by: ORTHOPAEDIC SURGERY

## 2023-09-20 PROCEDURE — 3066F NEPHROPATHY DOC TX: CPT | Mod: CPTII,,, | Performed by: ORTHOPAEDIC SURGERY

## 2023-09-20 PROCEDURE — 4010F PR ACE/ARB THEARPY RXD/TAKEN: ICD-10-PCS | Mod: CPTII,,, | Performed by: ORTHOPAEDIC SURGERY

## 2023-09-20 PROCEDURE — 99213 PR OFFICE/OUTPT VISIT, EST, LEVL III, 20-29 MIN: ICD-10-PCS | Mod: 25,,, | Performed by: ORTHOPAEDIC SURGERY

## 2023-09-20 PROCEDURE — 1159F PR MEDICATION LIST DOCUMENTED IN MEDICAL RECORD: ICD-10-PCS | Mod: CPTII,,, | Performed by: ORTHOPAEDIC SURGERY

## 2023-09-20 PROCEDURE — 3061F PR NEG MICROALBUMINURIA RESULT DOCUMENTED/REVIEW: ICD-10-PCS | Mod: CPTII,,, | Performed by: ORTHOPAEDIC SURGERY

## 2023-09-20 PROCEDURE — 1160F PR REVIEW ALL MEDS BY PRESCRIBER/CLIN PHARMACIST DOCUMENTED: ICD-10-PCS | Mod: CPTII,,, | Performed by: ORTHOPAEDIC SURGERY

## 2023-09-20 PROCEDURE — 3051F PR MOST RECENT HEMOGLOBIN A1C LEVEL 7.0 - < 8.0%: ICD-10-PCS | Mod: CPTII,,, | Performed by: ORTHOPAEDIC SURGERY

## 2023-09-20 PROCEDURE — 3066F PR DOCUMENTATION OF TREATMENT FOR NEPHROPATHY: ICD-10-PCS | Mod: CPTII,,, | Performed by: ORTHOPAEDIC SURGERY

## 2023-09-20 PROCEDURE — 20605 INTERMEDIATE JOINT ASPIRATION/INJECTION: ICD-10-PCS | Mod: RT,,, | Performed by: ORTHOPAEDIC SURGERY

## 2023-09-20 PROCEDURE — 3061F NEG MICROALBUMINURIA REV: CPT | Mod: CPTII,,, | Performed by: ORTHOPAEDIC SURGERY

## 2023-09-20 PROCEDURE — 1159F MED LIST DOCD IN RCRD: CPT | Mod: CPTII,,, | Performed by: ORTHOPAEDIC SURGERY

## 2023-09-20 PROCEDURE — 99213 OFFICE O/P EST LOW 20 MIN: CPT | Mod: 25,,, | Performed by: ORTHOPAEDIC SURGERY

## 2023-09-20 PROCEDURE — 3051F HG A1C>EQUAL 7.0%<8.0%: CPT | Mod: CPTII,,, | Performed by: ORTHOPAEDIC SURGERY

## 2023-09-20 PROCEDURE — 4010F ACE/ARB THERAPY RXD/TAKEN: CPT | Mod: CPTII,,, | Performed by: ORTHOPAEDIC SURGERY

## 2023-09-20 PROCEDURE — 1160F RVW MEDS BY RX/DR IN RCRD: CPT | Mod: CPTII,,, | Performed by: ORTHOPAEDIC SURGERY

## 2023-09-20 RX ORDER — BETAMETHASONE SODIUM PHOSPHATE AND BETAMETHASONE ACETATE 3; 3 MG/ML; MG/ML
6 INJECTION, SUSPENSION INTRA-ARTICULAR; INTRALESIONAL; INTRAMUSCULAR; SOFT TISSUE
Status: DISCONTINUED | OUTPATIENT
Start: 2023-09-20 | End: 2023-09-20 | Stop reason: HOSPADM

## 2023-09-20 RX ORDER — LIDOCAINE HYDROCHLORIDE 20 MG/ML
1.5 INJECTION, SOLUTION INFILTRATION; PERINEURAL
Status: DISCONTINUED | OUTPATIENT
Start: 2023-09-20 | End: 2023-09-20 | Stop reason: HOSPADM

## 2023-09-20 RX ADMIN — LIDOCAINE HYDROCHLORIDE 1.5 ML: 20 INJECTION, SOLUTION INFILTRATION; PERINEURAL at 03:09

## 2023-09-20 RX ADMIN — BETAMETHASONE SODIUM PHOSPHATE AND BETAMETHASONE ACETATE 6 MG: 3; 3 INJECTION, SUSPENSION INTRA-ARTICULAR; INTRALESIONAL; INTRAMUSCULAR; SOFT TISSUE at 03:09

## 2023-09-20 NOTE — PROGRESS NOTES
Subjective:    CC: Pain of the Right Elbow (Rt elbow pain, pt requesting injection today,states pain is the same as last visit.Taking Meloxicam PRN)       HPI:  Patient returns to clinic for repeat evaluation of right elbow.  She states that her pain has gotten somewhat better however continues to bother her.  She is taking meloxicam as needed.  She has tried bracing and topicals.  She states she would like to try steroid injections today.  She does have a previous reaction of rash to an injection she had while sick.  She states it was she is unsure if it was steroid versus pain medicine now.  She states the reaction was not facial swelling or shortness of breath.      ROS: Refer to \[facility]\"" for pertinent ROS. All other 12 point systems negative.    Objective:  There were no vitals filed for this visit.     Physical Exam:  Patient is well-nourished developed female she is awake alert and oriented x3 she is in no apparent stress is pleasant and cooperative.  Examination of the right upper extremity compartment soft and warm.  Skin is intact.  There is no signs symptoms of DVT or infection.  Examination of the elbow he has some tenderness along the lateral epicondyle region.  She does have pain with resisted wrist extension.  She has a negative hook sign over the biceps, she is nontender medially.  Her motion is 0-120 degrees she has no pain with pronation supination, neurovascular intact distally.    Images:  Previous. Images Reviewed and discussed with patient.    Assessment:  1. Lateral epicondylitis of right elbow  - Intermediate Joint Aspiration/Injection  - LIDOcaine HCL 20 mg/ml (2%) injection 1.5 mL  - betamethasone acetate-betamethasone sodium phosphate injection 6 mg        Plan:  Physical exam and previous imaging findings discussed with patient.  We have discussed her reaction to questionable steroid in the past.  Due to possible reaction with steroid she is having a decreased dose of steroid about the lateral  epicondyle.  She tolerated this well today in clinic.  Patient understands to watch her blood sugars closely.  We have also discussed antihistamines.  ER precautions given for anaphylaxis.  She will continue elbow exercises and bracing along with Mobic with appropriate precautions.  I would like see the patient back in 6 weeks to assess her progress.    Follow UP: No follow-ups on file.    R lateral epicondyleIntermediate Joint Aspiration/Injection    Date/Time: 9/20/2023 3:00 PM    Performed by: Dav Cortez MD  Authorized by: Dav Cortez MD    Consent Done?:  Yes (Verbal)  Indications:  Pain  Site marked: The procedure site was marked    Timeout: Prior to procedure the correct patient, procedure, and site was verified      Location:  Elbow  Prep: Patient was prepped and draped in usual sterile fashion    Medications:  1.5 mL LIDOcaine HCL 20 mg/ml (2%) 20 mg/mL (2 %); 6 mg betamethasone acetate-betamethasone sodium phosphate 6 mg/mL  Patient tolerance:  Patient tolerated the procedure well with no immediate complications       Additional Comments: 1.5 cc 2% lidocaine without epi & 1.5 cc betamethasone injected to the point of maximal tenderness about the ECRB.

## 2023-09-20 NOTE — PROCEDURES
R lateral epicondyleIntermediate Joint Aspiration/Injection    Date/Time: 9/20/2023 3:00 PM    Performed by: Dav Cortez MD  Authorized by: Dav Cortez MD    Consent Done?:  Yes (Verbal)  Indications:  Pain  Site marked: The procedure site was marked    Timeout: Prior to procedure the correct patient, procedure, and site was verified      Location:  Elbow  Prep: Patient was prepped and draped in usual sterile fashion    Medications:  1.5 mL LIDOcaine HCL 20 mg/ml (2%) 20 mg/mL (2 %); 6 mg betamethasone acetate-betamethasone sodium phosphate 6 mg/mL  Patient tolerance:  Patient tolerated the procedure well with no immediate complications       Additional Comments: 1.5 cc 2% lidocaine without epi & 1.5 cc betamethasone injected to the point of maximal tenderness about the ECRB.

## 2023-10-09 ENCOUNTER — TELEPHONE (OUTPATIENT)
Dept: FAMILY MEDICINE | Facility: CLINIC | Age: 43
End: 2023-10-09
Payer: COMMERCIAL

## 2023-10-09 DIAGNOSIS — E11.9 TYPE 2 DIABETES MELLITUS WITHOUT COMPLICATION, WITHOUT LONG-TERM CURRENT USE OF INSULIN: ICD-10-CM

## 2023-10-09 RX ORDER — SEMAGLUTIDE 1.34 MG/ML
1 INJECTION, SOLUTION SUBCUTANEOUS
Qty: 3 ML | Refills: 11 | Status: SHIPPED | OUTPATIENT
Start: 2023-10-09 | End: 2023-11-30

## 2023-10-09 NOTE — TELEPHONE ENCOUNTER
----- Message from Daniella Cesar sent at 10/9/2023 10:46 AM CDT -----  Regarding: refill  Type:  RX Refill Request    Who Called: pt  Refill or New Rx:refill  RX Name and Strength:semaglutide (OZEMPIC) 1 mg/dose (4 mg/3 mL)  How is the patient currently taking it?   Is this a 30 day or 90 day RX:  Preferred Pharmacy with phone number:[facility]  Local or Mail Order:local  Ordering Provider:chris nettles  Would the patient rather a call back or a response via [facility]? C/b  Best Call Back Number:[phone]    Additional Information: pharmacy states they do not have script in there system

## 2023-10-20 ENCOUNTER — TELEPHONE (OUTPATIENT)
Dept: FAMILY MEDICINE | Facility: CLINIC | Age: 43
End: 2023-10-20
Payer: COMMERCIAL

## 2023-10-20 DIAGNOSIS — R11.0 NAUSEA: Primary | ICD-10-CM

## 2023-10-20 RX ORDER — ONDANSETRON 8 MG/1
8 TABLET, ORALLY DISINTEGRATING ORAL EVERY 6 HOURS PRN
Qty: 30 TABLET | Refills: 11 | Status: SHIPPED | OUTPATIENT
Start: 2023-10-20 | End: 2024-03-01 | Stop reason: ALTCHOICE

## 2023-10-20 NOTE — TELEPHONE ENCOUNTER
----- Message from Elidia Orona MA sent at 10/20/2023 11:26 AM CDT -----    ----- Message -----  From: Lucía Brown  Sent: 10/20/2023  11:23 AM CDT  To: Socrates THOMAS Staff    .Type:  Needs Medical Advice    Who Called:  pt    Symptoms (please be specific):  nauseated     How long has patient had these symptoms:   6 weeks    Pharmacy name and phone #:   [facility]     Would the patient rather a call back? Yes    Best Call Back Number:  [phone]    Additional Information:  pt wants some ZOFRAN called in thanks

## 2023-11-01 ENCOUNTER — OFFICE VISIT (OUTPATIENT)
Dept: ORTHOPEDICS | Facility: CLINIC | Age: 43
End: 2023-11-01
Payer: COMMERCIAL

## 2023-11-01 VITALS — HEART RATE: 75 BPM | TEMPERATURE: 98 F | DIASTOLIC BLOOD PRESSURE: 83 MMHG | SYSTOLIC BLOOD PRESSURE: 117 MMHG

## 2023-11-01 DIAGNOSIS — M77.11 LATERAL EPICONDYLITIS OF RIGHT ELBOW: Primary | ICD-10-CM

## 2023-11-01 PROCEDURE — 1159F MED LIST DOCD IN RCRD: CPT | Mod: CPTII,,, | Performed by: ORTHOPAEDIC SURGERY

## 2023-11-01 PROCEDURE — 99214 OFFICE O/P EST MOD 30 MIN: CPT | Mod: 25,,, | Performed by: ORTHOPAEDIC SURGERY

## 2023-11-01 PROCEDURE — 3079F PR MOST RECENT DIASTOLIC BLOOD PRESSURE 80-89 MM HG: ICD-10-PCS | Mod: CPTII,,, | Performed by: ORTHOPAEDIC SURGERY

## 2023-11-01 PROCEDURE — 3066F PR DOCUMENTATION OF TREATMENT FOR NEPHROPATHY: ICD-10-PCS | Mod: CPTII,,, | Performed by: ORTHOPAEDIC SURGERY

## 2023-11-01 PROCEDURE — 3079F DIAST BP 80-89 MM HG: CPT | Mod: CPTII,,, | Performed by: ORTHOPAEDIC SURGERY

## 2023-11-01 PROCEDURE — 3074F PR MOST RECENT SYSTOLIC BLOOD PRESSURE < 130 MM HG: ICD-10-PCS | Mod: CPTII,,, | Performed by: ORTHOPAEDIC SURGERY

## 2023-11-01 PROCEDURE — 3051F PR MOST RECENT HEMOGLOBIN A1C LEVEL 7.0 - < 8.0%: ICD-10-PCS | Mod: CPTII,,, | Performed by: ORTHOPAEDIC SURGERY

## 2023-11-01 PROCEDURE — 3061F PR NEG MICROALBUMINURIA RESULT DOCUMENTED/REVIEW: ICD-10-PCS | Mod: CPTII,,, | Performed by: ORTHOPAEDIC SURGERY

## 2023-11-01 PROCEDURE — 4010F PR ACE/ARB THEARPY RXD/TAKEN: ICD-10-PCS | Mod: CPTII,,, | Performed by: ORTHOPAEDIC SURGERY

## 2023-11-01 PROCEDURE — 20605 INTERMEDIATE JOINT ASPIRATION/INJECTION: ICD-10-PCS | Mod: RT,,, | Performed by: ORTHOPAEDIC SURGERY

## 2023-11-01 PROCEDURE — 3074F SYST BP LT 130 MM HG: CPT | Mod: CPTII,,, | Performed by: ORTHOPAEDIC SURGERY

## 2023-11-01 PROCEDURE — 1160F PR REVIEW ALL MEDS BY PRESCRIBER/CLIN PHARMACIST DOCUMENTED: ICD-10-PCS | Mod: CPTII,,, | Performed by: ORTHOPAEDIC SURGERY

## 2023-11-01 PROCEDURE — 3061F NEG MICROALBUMINURIA REV: CPT | Mod: CPTII,,, | Performed by: ORTHOPAEDIC SURGERY

## 2023-11-01 PROCEDURE — 99214 PR OFFICE/OUTPT VISIT, EST, LEVL IV, 30-39 MIN: ICD-10-PCS | Mod: 25,,, | Performed by: ORTHOPAEDIC SURGERY

## 2023-11-01 PROCEDURE — 4010F ACE/ARB THERAPY RXD/TAKEN: CPT | Mod: CPTII,,, | Performed by: ORTHOPAEDIC SURGERY

## 2023-11-01 PROCEDURE — 1160F RVW MEDS BY RX/DR IN RCRD: CPT | Mod: CPTII,,, | Performed by: ORTHOPAEDIC SURGERY

## 2023-11-01 PROCEDURE — 20605 DRAIN/INJ JOINT/BURSA W/O US: CPT | Mod: RT,,, | Performed by: ORTHOPAEDIC SURGERY

## 2023-11-01 PROCEDURE — 3066F NEPHROPATHY DOC TX: CPT | Mod: CPTII,,, | Performed by: ORTHOPAEDIC SURGERY

## 2023-11-01 PROCEDURE — 1159F PR MEDICATION LIST DOCUMENTED IN MEDICAL RECORD: ICD-10-PCS | Mod: CPTII,,, | Performed by: ORTHOPAEDIC SURGERY

## 2023-11-01 PROCEDURE — 3051F HG A1C>EQUAL 7.0%<8.0%: CPT | Mod: CPTII,,, | Performed by: ORTHOPAEDIC SURGERY

## 2023-11-01 RX ORDER — LIDOCAINE HYDROCHLORIDE 20 MG/ML
2 INJECTION, SOLUTION INFILTRATION; PERINEURAL
Status: DISCONTINUED | OUTPATIENT
Start: 2023-11-01 | End: 2023-11-01 | Stop reason: HOSPADM

## 2023-11-01 RX ORDER — BETAMETHASONE SODIUM PHOSPHATE AND BETAMETHASONE ACETATE 3; 3 MG/ML; MG/ML
6 INJECTION, SUSPENSION INTRA-ARTICULAR; INTRALESIONAL; INTRAMUSCULAR; SOFT TISSUE
Status: DISCONTINUED | OUTPATIENT
Start: 2023-11-01 | End: 2023-11-01 | Stop reason: HOSPADM

## 2023-11-01 RX ADMIN — BETAMETHASONE SODIUM PHOSPHATE AND BETAMETHASONE ACETATE 6 MG: 3; 3 INJECTION, SUSPENSION INTRA-ARTICULAR; INTRALESIONAL; INTRAMUSCULAR; SOFT TISSUE at 03:11

## 2023-11-01 RX ADMIN — LIDOCAINE HYDROCHLORIDE 2 ML: 20 INJECTION, SOLUTION INFILTRATION; PERINEURAL at 03:11

## 2023-11-01 NOTE — PROCEDURES
R lateral epicondyleIntermediate Joint Aspiration/Injection    Date/Time: 11/1/2023 3:15 PM    Performed by: Dav Cortez MD  Authorized by: Dav Cortez MD    Consent Done?:  Yes (Verbal)  Indications:  Pain  Site marked: The procedure site was marked    Timeout: Prior to procedure the correct patient, procedure, and site was verified      Location:  Elbow  Prep: Patient was prepped and draped in usual sterile fashion    Medications:  2 mL LIDOcaine HCL 20 mg/ml (2%) 20 mg/mL (2 %); 6 mg betamethasone acetate-betamethasone sodium phosphate 6 mg/mL  Patient tolerance:  Patient tolerated the procedure well with no immediate complications       Additional Comments: 1.5 cc 2% lidocaine without epi & 1.5 cc betamethasone injected to the point of maximal tenderness about the ECRB.

## 2023-11-01 NOTE — PROGRESS NOTES
Subjective:    CC: Pain of the Right Elbow (F/U right elbow inj )       HPI:  Patient returns today for repeat exam.  Patient states the half injection helped a lot in her right elbow.  She still feels it, wearing off, she would like to repeat this today.  She denies any new complaints.    ROS: Refer to HPI for pertinent ROS. All other 12 point systems negative.    Objective:  Vitals:    11/01/23 1521   BP: 117/83   Pulse: 75   Temp: 97.9 °F (36.6 °C)   TempSrc: Oral        Physical Exam:  Right upper extremity compartment soft and warm.  Skin is intact.  There is no signs symptoms of DVT or infection.  She is point tender along the lateral epicondyle region.  She has full pronation supination flexion extension stable to stressing negative hook sign, neurovascular intact distally.    Images: . Images Reviewed and discussed with patient.    Assessment:  1. Lateral epicondylitis of right elbow  - Intermediate Joint Aspiration/Injection        Plan:  At this time we discussed her physical exam and previous imaging findings.  She tolerated her half dose injection very well to the right lateral epicondyle region.  Have discussed her elbow strap, elbow exercises.  I would like see back with any problems or difficulties.    Follow UP: No follow-ups on file.    R lateral epicondyleIntermediate Joint Aspiration/Injection    Date/Time: 11/1/2023 3:15 PM    Performed by: Dav Cortez MD  Authorized by: Dav Cortez MD    Consent Done?:  Yes (Verbal)  Indications:  Pain  Site marked: The procedure site was marked    Timeout: Prior to procedure the correct patient, procedure, and site was verified      Location:  Elbow  Prep: Patient was prepped and draped in usual sterile fashion    Medications:  2 mL LIDOcaine HCL 20 mg/ml (2%) 20 mg/mL (2 %); 6 mg betamethasone acetate-betamethasone sodium phosphate 6 mg/mL  Patient tolerance:  Patient tolerated the procedure well with no immediate complications       Additional  Comments: 1.5 cc 2% lidocaine without epi & 1.5 cc betamethasone injected to the point of maximal tenderness about the ECRB.

## 2023-11-21 ENCOUNTER — TELEPHONE (OUTPATIENT)
Dept: FAMILY MEDICINE | Facility: CLINIC | Age: 43
End: 2023-11-21
Payer: COMMERCIAL

## 2023-11-21 NOTE — TELEPHONE ENCOUNTER
Are there any outstanding tasks in patient's chart?    n  2. Do we have outstanding/pending referrals?  n    3. Has the patient been seen in an ER, Urgent Care, or admitted since last visit?  n    4. Has patient seen any other health care providers since last visit?  Dr. Dav Cortez    5.  Has patient had any blood work or x-rays done since last visit?   Will complete A1C at visit  Completed CT Calcium Score and Mammogram

## 2023-11-30 ENCOUNTER — OFFICE VISIT (OUTPATIENT)
Dept: FAMILY MEDICINE | Facility: CLINIC | Age: 43
End: 2023-11-30
Payer: COMMERCIAL

## 2023-11-30 VITALS
RESPIRATION RATE: 16 BRPM | WEIGHT: 205.38 LBS | HEIGHT: 60 IN | TEMPERATURE: 98 F | SYSTOLIC BLOOD PRESSURE: 118 MMHG | OXYGEN SATURATION: 98 % | HEART RATE: 77 BPM | BODY MASS INDEX: 40.32 KG/M2 | DIASTOLIC BLOOD PRESSURE: 86 MMHG

## 2023-11-30 DIAGNOSIS — G47.33 OSA (OBSTRUCTIVE SLEEP APNEA): ICD-10-CM

## 2023-11-30 DIAGNOSIS — E66.01 MORBID OBESITY: ICD-10-CM

## 2023-11-30 DIAGNOSIS — I10 PRIMARY HYPERTENSION: ICD-10-CM

## 2023-11-30 DIAGNOSIS — T75.3XXA SEA SICKNESS, INITIAL ENCOUNTER: ICD-10-CM

## 2023-11-30 DIAGNOSIS — K21.9 GASTROESOPHAGEAL REFLUX DISEASE WITHOUT ESOPHAGITIS: ICD-10-CM

## 2023-11-30 DIAGNOSIS — E11.9 TYPE 2 DIABETES MELLITUS WITHOUT COMPLICATION, WITHOUT LONG-TERM CURRENT USE OF INSULIN: Primary | ICD-10-CM

## 2023-11-30 LAB — HBA1C MFR BLD: 6.1 % (ref 4.8–5.6)

## 2023-11-30 PROCEDURE — 83036 HEMOGLOBIN GLYCOSYLATED A1C: CPT | Mod: QW,,, | Performed by: NURSE PRACTITIONER

## 2023-11-30 PROCEDURE — 4010F ACE/ARB THERAPY RXD/TAKEN: CPT | Mod: CPTII,,, | Performed by: NURSE PRACTITIONER

## 2023-11-30 PROCEDURE — 3061F NEG MICROALBUMINURIA REV: CPT | Mod: CPTII,,, | Performed by: NURSE PRACTITIONER

## 2023-11-30 PROCEDURE — 3074F SYST BP LT 130 MM HG: CPT | Mod: CPTII,,, | Performed by: NURSE PRACTITIONER

## 2023-11-30 PROCEDURE — 3044F PR MOST RECENT HEMOGLOBIN A1C LEVEL <7.0%: ICD-10-PCS | Mod: CPTII,,, | Performed by: NURSE PRACTITIONER

## 2023-11-30 PROCEDURE — 3044F HG A1C LEVEL LT 7.0%: CPT | Mod: CPTII,,, | Performed by: NURSE PRACTITIONER

## 2023-11-30 PROCEDURE — 1159F PR MEDICATION LIST DOCUMENTED IN MEDICAL RECORD: ICD-10-PCS | Mod: CPTII,,, | Performed by: NURSE PRACTITIONER

## 2023-11-30 PROCEDURE — 83036 POCT HEMOGLOBIN A1C: ICD-10-PCS | Mod: QW,,, | Performed by: NURSE PRACTITIONER

## 2023-11-30 PROCEDURE — 99214 PR OFFICE/OUTPT VISIT, EST, LEVL IV, 30-39 MIN: ICD-10-PCS | Mod: ,,, | Performed by: NURSE PRACTITIONER

## 2023-11-30 PROCEDURE — 99214 OFFICE O/P EST MOD 30 MIN: CPT | Mod: ,,, | Performed by: NURSE PRACTITIONER

## 2023-11-30 PROCEDURE — 3079F DIAST BP 80-89 MM HG: CPT | Mod: CPTII,,, | Performed by: NURSE PRACTITIONER

## 2023-11-30 PROCEDURE — 1160F PR REVIEW ALL MEDS BY PRESCRIBER/CLIN PHARMACIST DOCUMENTED: ICD-10-PCS | Mod: CPTII,,, | Performed by: NURSE PRACTITIONER

## 2023-11-30 PROCEDURE — 3079F PR MOST RECENT DIASTOLIC BLOOD PRESSURE 80-89 MM HG: ICD-10-PCS | Mod: CPTII,,, | Performed by: NURSE PRACTITIONER

## 2023-11-30 PROCEDURE — 3074F PR MOST RECENT SYSTOLIC BLOOD PRESSURE < 130 MM HG: ICD-10-PCS | Mod: CPTII,,, | Performed by: NURSE PRACTITIONER

## 2023-11-30 PROCEDURE — 4010F PR ACE/ARB THEARPY RXD/TAKEN: ICD-10-PCS | Mod: CPTII,,, | Performed by: NURSE PRACTITIONER

## 2023-11-30 PROCEDURE — 3066F PR DOCUMENTATION OF TREATMENT FOR NEPHROPATHY: ICD-10-PCS | Mod: CPTII,,, | Performed by: NURSE PRACTITIONER

## 2023-11-30 PROCEDURE — 1160F RVW MEDS BY RX/DR IN RCRD: CPT | Mod: CPTII,,, | Performed by: NURSE PRACTITIONER

## 2023-11-30 PROCEDURE — 3061F PR NEG MICROALBUMINURIA RESULT DOCUMENTED/REVIEW: ICD-10-PCS | Mod: CPTII,,, | Performed by: NURSE PRACTITIONER

## 2023-11-30 PROCEDURE — 3008F BODY MASS INDEX DOCD: CPT | Mod: CPTII,,, | Performed by: NURSE PRACTITIONER

## 2023-11-30 PROCEDURE — 1159F MED LIST DOCD IN RCRD: CPT | Mod: CPTII,,, | Performed by: NURSE PRACTITIONER

## 2023-11-30 PROCEDURE — 3066F NEPHROPATHY DOC TX: CPT | Mod: CPTII,,, | Performed by: NURSE PRACTITIONER

## 2023-11-30 PROCEDURE — 3008F PR BODY MASS INDEX (BMI) DOCUMENTED: ICD-10-PCS | Mod: CPTII,,, | Performed by: NURSE PRACTITIONER

## 2023-11-30 RX ORDER — SEMAGLUTIDE 1.34 MG/ML
1 INJECTION, SOLUTION SUBCUTANEOUS
Qty: 3 ML | Refills: 11 | Status: SHIPPED | OUTPATIENT
Start: 2023-11-30 | End: 2024-03-12

## 2023-11-30 RX ORDER — SCOLOPAMINE TRANSDERMAL SYSTEM 1 MG/1
1 PATCH, EXTENDED RELEASE TRANSDERMAL
Qty: 4 PATCH | Refills: 0 | Status: SHIPPED | OUTPATIENT
Start: 2023-11-30 | End: 2024-03-01 | Stop reason: ALTCHOICE

## 2023-11-30 RX ORDER — PANTOPRAZOLE SODIUM 40 MG/1
40 TABLET, DELAYED RELEASE ORAL DAILY
Qty: 30 TABLET | Refills: 11 | Status: SHIPPED | OUTPATIENT
Start: 2023-11-30 | End: 2024-03-01

## 2023-11-30 NOTE — PROGRESS NOTES
Subjective:       Patient ID: Kathryn Scruggs is a 43 y.o. female.    Chief Complaint: Diabetes (3 month f/u with A1C in office), Hypertension (3 month f/u), and Obesity (3 month f/u. Having side effects with Ozempic. Would like to discuss other options.)      HPI   This is a 43-year-old white female who presents to clinic today for three-month follow-up for new onset type 2 diabetes, morbid obesity, hypertension, sleep apnea.  Reports that initially was doing okay with the Ozempic, was having a lot of nausea but Zofran helped.  Since moving up to the 1 mg dose, she is also having epigastric pain.  Is going on a cruise soon, has never been on a cruise and is worried about seasickness.  Review of Systems  Comprehensive review of systems negative except as stated in HPI    The patient's Health Maintenance was reviewed and the following appears to be due:   There are no preventive care reminders to display for this patient.    Past Medical History:  Past Medical History:   Diagnosis Date    ADHD (attention deficit hyperactivity disorder)     Depression     Pre-diabetes      Past Surgical History:   Procedure Laterality Date     SECTION  2003     SECTION  2010     Review of patient's allergies indicates:   Allergen Reactions    Bacitracin Rash     rash      Cortisone Rash     rash    Dexamethasone Rash    Neomycin-bacitracin-polymyxin Rash     rash       Current Outpatient Medications on File Prior to Visit   Medication Sig Dispense Refill    lisinopriL (PRINIVIL,ZESTRIL) 2.5 MG tablet Take 1 tablet (2.5 mg total) by mouth once daily. 90 tablet 3    ondansetron (ZOFRAN-ODT) 8 MG TbDL Take 1 tablet (8 mg total) by mouth every 6 (six) hours as needed (nausea). 30 tablet 11    [DISCONTINUED] semaglutide (OZEMPIC) 1 mg/dose (4 mg/3 mL) Inject 1 mg into the skin every 7 days. 3 mL 11    [DISCONTINUED] meloxicam (MOBIC) 15 MG tablet Take 1 tablet (15 mg total) by mouth once daily. 30 tablet 0      No current facility-administered medications on file prior to visit.     Social History     Socioeconomic History    Marital status:    Tobacco Use    Smoking status: Former     Current packs/day: 0.00     Types: Cigarettes     Start date:      Quit date: 2009     Years since quittin.4    Smokeless tobacco: Never   Substance and Sexual Activity    Alcohol use: Not Currently     Comment: occasional    Drug use: Not Currently     Types: Marijuana    Sexual activity: Yes     Partners: Male     Family History   Problem Relation Age of Onset    No Known Problems Mother        Objective:       /86 (BP Location: Left arm)   Pulse 77   Temp 98.1 °F (36.7 °C) (Temporal)   Resp 16   Ht 5' (1.524 m)   Wt 93.2 kg (205 lb 6.4 oz)   LMP 2023 (Exact Date)   SpO2 98%   BMI 40.11 kg/m²      Physical Exam  Vitals and nursing note reviewed.   Constitutional:       Appearance: Normal appearance. She is normal weight.   HENT:      Head: Normocephalic and atraumatic.      Right Ear: Tympanic membrane, ear canal and external ear normal.      Left Ear: Tympanic membrane, ear canal and external ear normal.      Nose: Nose normal.      Mouth/Throat:      Mouth: Mucous membranes are moist.      Pharynx: Oropharynx is clear.   Eyes:      Extraocular Movements: Extraocular movements intact.      Conjunctiva/sclera: Conjunctivae normal.      Pupils: Pupils are equal, round, and reactive to light.   Cardiovascular:      Rate and Rhythm: Normal rate and regular rhythm.      Heart sounds: Normal heart sounds.   Pulmonary:      Effort: Pulmonary effort is normal.      Breath sounds: Normal breath sounds.   Abdominal:      General: Abdomen is flat. Bowel sounds are normal.      Palpations: Abdomen is soft.   Musculoskeletal:         General: Normal range of motion.      Cervical back: Normal range of motion and neck supple.   Skin:     General: Skin is warm and dry.   Neurological:      General: No focal  deficit present.      Mental Status: She is alert and oriented to person, place, and time.   Psychiatric:         Mood and Affect: Mood normal.         Behavior: Behavior normal.         Thought Content: Thought content normal.         Judgment: Judgment normal.         Office Visit on 11/30/2023   Component Date Value Ref Range Status    Hemoglobin A1C, POC 11/30/2023 6.1 (A)  4.8 - 5.6 % Final         Assessment and Plan       ICD-10-CM ICD-9-CM   1. Type 2 diabetes mellitus without complication, without long-term current use of insulin  E11.9 250.00   2. Morbid obesity  E66.01 278.01   3. Gastroesophageal reflux disease without esophagitis  K21.9 530.81   4. Primary hypertension  I10 401.9   5. PAUL (obstructive sleep apnea)  G47.33 327.23   6. Sea sickness, initial encounter  T75.3XXA 994.6        1. Type 2 diabetes mellitus without complication, without long-term current use of insulin  Overview:  Diagnosed age 42 (August 2023), Hgb A1c 7.2    08/29/2023 - Ozempic 0.25 mg SQ weekly    Assessment & Plan:  Improved, hemoglobin A1c 6.1.  Patient reports abdominal pain/epigastric pain with Ozempic 1 mg dose.  Nausea is controlled with Zofran.  Instructed to decrease dose back to 0.5 mg weekly.  Follow-up 3 months.    Orders:  -     Hemoglobin A1C, POCT  -     semaglutide (OZEMPIC) 1 mg/dose (4 mg/3 mL); Inject 1 mg into the skin every 7 days.  Dispense: 3 mL; Refill: 11    2. Morbid obesity  Overview:  08/29/2023 - 220 lb, BMI 43.08, start Ozempic  11/30/2023 - 205 lb, BMI 40.11    Assessment & Plan:  Patient down 15 lb, continue Ozempic, follow-up 3 months.      3. Gastroesophageal reflux disease without esophagitis  Overview:  11/30/2023 - trial of pantoprazole 40 mg daily    Assessment & Plan:  Trial of pantoprazole 40 mg daily, follow-up 3 months.    Orders:  -     pantoprazole (PROTONIX) 40 MG tablet; Take 1 tablet (40 mg total) by mouth once daily.  Dispense: 30 tablet; Refill: 11    4. Primary  hypertension  Overview:  08/29/2023 - lisinopril 2.5 mg daily    Assessment & Plan:  Improved, continue lisinopril 2.5 mg daily, follow-up 3 months.      5. PAUL (obstructive sleep apnea)  Overview:  Home sleep study completed with [facility] 08/22/2023  AHI 25.2  Min SpO2 89%  AutoPap 5-20 cmH20 ([facility])    Assessment & Plan:  Patient does admit that she is not compliant every night with her auto PAP, encouraged nightly compliance of at least 4 hours to help alleviate some of her daytime sleepiness.      6. Sea sickness, initial encounter  Comments:  Transderm scopolamine patches to take with her on her cruise as needed  Orders:  -     scopolamine (TRANSDERM-SCOP) 1.3-1.5 mg (1 mg over 3 days); Place 1 patch onto the skin every 72 hours.  Dispense: 4 patch; Refill: 0           Follow up in about 3 months (around 2/29/2024) for follow up Hgb A1c in office .

## 2023-11-30 NOTE — ASSESSMENT & PLAN NOTE
Patient does admit that she is not compliant every night with her auto PAP, encouraged nightly compliance of at least 4 hours to help alleviate some of her daytime sleepiness.

## 2023-11-30 NOTE — ASSESSMENT & PLAN NOTE
Improved, hemoglobin A1c 6.1.  Patient reports abdominal pain/epigastric pain with Ozempic 1 mg dose.  Nausea is controlled with Zofran.  Instructed to decrease dose back to 0.5 mg weekly.  Follow-up 3 months.

## 2023-12-04 PROBLEM — Z00.00 ANNUAL PHYSICAL EXAM: Status: RESOLVED | Noted: 2023-08-29 | Resolved: 2023-12-04

## 2024-01-16 ENCOUNTER — TELEPHONE (OUTPATIENT)
Dept: FAMILY MEDICINE | Facility: CLINIC | Age: 44
End: 2024-01-16
Payer: COMMERCIAL

## 2024-01-16 NOTE — TELEPHONE ENCOUNTER
----- Message from Barbara Reid sent at 1/12/2024 12:01 PM CST -----  Regarding: Medical Advice  Type:  Needs Medical Advice    Who Called: Kathryn    Would the patient rather a call back or a response via [facility]? Call     Best Call Back Number: [phone]    Additional Information: Kathryn is requesting a call back to discuss her medication.

## 2024-01-22 ENCOUNTER — TELEPHONE (OUTPATIENT)
Dept: FAMILY MEDICINE | Facility: CLINIC | Age: 44
End: 2024-01-22
Payer: COMMERCIAL

## 2024-01-22 NOTE — TELEPHONE ENCOUNTER
----- Message from Heavenly Brown sent at 1/22/2024  2:41 PM CST -----  .Type:  Needs Medical Advice    Who Called: pt  Symptoms (please be specific):    How long has patient had these symptoms:    Pharmacy name and phone #:    Would the patient rather a call back or a response via [facility]?   Best Call Back Number: [phone]  Additional Information: semaglutide (OZEMPIC) 1 mg/dose (4 mg/3 mL) PT asking can she take it one day early she leaving for cruise Saturday  and she takes it on Sunday

## 2024-02-23 ENCOUNTER — TELEPHONE (OUTPATIENT)
Dept: FAMILY MEDICINE | Facility: CLINIC | Age: 44
End: 2024-02-23
Payer: COMMERCIAL

## 2024-02-23 NOTE — TELEPHONE ENCOUNTER
Attempted to contact patient for previsit on 2/23/24 at 9:34.  Left message reminding patient about her upcoming visit.  A1C will be completed at visit.

## 2024-03-01 ENCOUNTER — OFFICE VISIT (OUTPATIENT)
Dept: FAMILY MEDICINE | Facility: CLINIC | Age: 44
End: 2024-03-01
Payer: COMMERCIAL

## 2024-03-01 VITALS
OXYGEN SATURATION: 98 % | WEIGHT: 195.63 LBS | SYSTOLIC BLOOD PRESSURE: 132 MMHG | DIASTOLIC BLOOD PRESSURE: 90 MMHG | HEART RATE: 82 BPM | HEIGHT: 60 IN | TEMPERATURE: 99 F | RESPIRATION RATE: 16 BRPM | BODY MASS INDEX: 38.41 KG/M2

## 2024-03-01 DIAGNOSIS — E66.01 MORBID OBESITY: ICD-10-CM

## 2024-03-01 DIAGNOSIS — I10 PRIMARY HYPERTENSION: ICD-10-CM

## 2024-03-01 DIAGNOSIS — E11.9 TYPE 2 DIABETES MELLITUS WITHOUT COMPLICATION, WITHOUT LONG-TERM CURRENT USE OF INSULIN: Primary | ICD-10-CM

## 2024-03-01 LAB — HBA1C MFR BLD: 6.5 %

## 2024-03-01 PROCEDURE — 99213 OFFICE O/P EST LOW 20 MIN: CPT | Mod: ,,, | Performed by: NURSE PRACTITIONER

## 2024-03-01 PROCEDURE — 3080F DIAST BP >= 90 MM HG: CPT | Mod: CPTII,,, | Performed by: NURSE PRACTITIONER

## 2024-03-01 PROCEDURE — 4010F ACE/ARB THERAPY RXD/TAKEN: CPT | Mod: CPTII,,, | Performed by: NURSE PRACTITIONER

## 2024-03-01 PROCEDURE — 3008F BODY MASS INDEX DOCD: CPT | Mod: CPTII,,, | Performed by: NURSE PRACTITIONER

## 2024-03-01 PROCEDURE — 3044F HG A1C LEVEL LT 7.0%: CPT | Mod: CPTII,,, | Performed by: NURSE PRACTITIONER

## 2024-03-01 PROCEDURE — 1160F RVW MEDS BY RX/DR IN RCRD: CPT | Mod: CPTII,,, | Performed by: NURSE PRACTITIONER

## 2024-03-01 PROCEDURE — 83036 HEMOGLOBIN GLYCOSYLATED A1C: CPT | Mod: QW,,, | Performed by: NURSE PRACTITIONER

## 2024-03-01 PROCEDURE — 3075F SYST BP GE 130 - 139MM HG: CPT | Mod: CPTII,,, | Performed by: NURSE PRACTITIONER

## 2024-03-01 PROCEDURE — 1159F MED LIST DOCD IN RCRD: CPT | Mod: CPTII,,, | Performed by: NURSE PRACTITIONER

## 2024-03-01 RX ORDER — LISINOPRIL 2.5 MG/1
2.5 TABLET ORAL DAILY
Qty: 90 TABLET | Refills: 3 | Status: SHIPPED | OUTPATIENT
Start: 2024-03-01 | End: 2024-06-06

## 2024-03-01 NOTE — ASSESSMENT & PLAN NOTE
Hemoglobin A1c back up to 6.5.  Continue Ozempic 1 mg weekly.  Refused addition of oral medication.  Discussed strict diabetic diet, follow-up 3 months.

## 2024-03-01 NOTE — PROGRESS NOTES
Subjective:       Patient ID: Kathryn Scruggs is a 43 y.o. female.    Chief Complaint: Diabetes (3 month f/u), Gastroesophageal Reflux (3 month f/u), and Hypertension (3 month f/u)      HPI   This is a 43-year-old white female who presents to clinic today for three-month follow-up for type 2 diabetes, hypertension, obesity.  Reports she stopped her blood pressure medication because she wanted to see if her blood pressure was still going to be high now that she has no longer in pain.  Has not been checking her blood pressure.  No complaints today.  Review of Systems  Comprehensive review of systems negative except as stated in HPI    The patient's Health Maintenance was reviewed and the following appears to be due:   There are no preventive care reminders to display for this patient.    Past Medical History:  Past Medical History:   Diagnosis Date    ADHD (attention deficit hyperactivity disorder)     Depression     Pre-diabetes      Past Surgical History:   Procedure Laterality Date     SECTION  2003     SECTION  2010     Review of patient's allergies indicates:   Allergen Reactions    Bacitracin Rash     rash      Cortisone Rash     rash    Dexamethasone Rash    Neomycin-bacitracin-polymyxin Rash     rash       Current Outpatient Medications on File Prior to Visit   Medication Sig Dispense Refill    semaglutide (OZEMPIC) 1 mg/dose (4 mg/3 mL) Inject 1 mg into the skin every 7 days. 3 mL 11    [DISCONTINUED] lisinopriL (PRINIVIL,ZESTRIL) 2.5 MG tablet Take 1 tablet (2.5 mg total) by mouth once daily. (Patient not taking: Reported on 3/1/2024) 90 tablet 3    [DISCONTINUED] ondansetron (ZOFRAN-ODT) 8 MG TbDL Take 1 tablet (8 mg total) by mouth every 6 (six) hours as needed (nausea). (Patient not taking: Reported on 3/1/2024) 30 tablet 11    [DISCONTINUED] pantoprazole (PROTONIX) 40 MG tablet Take 1 tablet (40 mg total) by mouth once daily. (Patient not taking: Reported on 3/1/2024) 30  tablet 11    [DISCONTINUED] scopolamine (TRANSDERM-SCOP) 1.3-1.5 mg (1 mg over 3 days) Place 1 patch onto the skin every 72 hours. 4 patch 0     No current facility-administered medications on file prior to visit.     Social History     Socioeconomic History    Marital status:    Tobacco Use    Smoking status: Former     Current packs/day: 0.00     Types: Cigarettes     Start date:      Quit date: 2009     Years since quittin.6    Smokeless tobacco: Never   Substance and Sexual Activity    Alcohol use: Not Currently     Comment: occasional    Drug use: Not Currently     Types: Marijuana    Sexual activity: Yes     Partners: Male     Family History   Problem Relation Age of Onset    No Known Problems Mother        Objective:       BP (!) 132/90   Pulse 82   Temp 99.1 °F (37.3 °C) (Oral)   Resp 16   Ht 5' (1.524 m)   Wt 88.7 kg (195 lb 9.6 oz)   LMP 02/10/2024 (Exact Date)   SpO2 98%   BMI 38.20 kg/m²      Physical Exam  Vitals and nursing note reviewed.   Constitutional:       Appearance: Normal appearance. She is obese.   HENT:      Head: Normocephalic and atraumatic.   Eyes:      Extraocular Movements: Extraocular movements intact.      Conjunctiva/sclera: Conjunctivae normal.      Pupils: Pupils are equal, round, and reactive to light.   Cardiovascular:      Rate and Rhythm: Normal rate and regular rhythm.      Heart sounds: Normal heart sounds.   Pulmonary:      Effort: Pulmonary effort is normal.      Breath sounds: Normal breath sounds.   Musculoskeletal:         General: Normal range of motion.      Cervical back: Normal range of motion and neck supple.   Skin:     General: Skin is warm and dry.   Neurological:      General: No focal deficit present.      Mental Status: She is alert and oriented to person, place, and time.   Psychiatric:         Mood and Affect: Mood normal.         Behavior: Behavior normal.         Thought Content: Thought content normal.         Judgment:  Judgment normal.         Labs  Office Visit on 03/01/2024   Component Date Value Ref Range Status    Hemoglobin A1C, POC 03/01/2024 6.5  % Final   Office Visit on 11/30/2023   Component Date Value Ref Range Status    Hemoglobin A1C, POC 11/30/2023 6.1 (A)  4.8 - 5.6 % Final       Assessment and Plan       ICD-10-CM ICD-9-CM   1. Type 2 diabetes mellitus without complication, without long-term current use of insulin  E11.9 250.00   2. Primary hypertension  I10 401.9   3. Morbid obesity  E66.01 278.01        1. Type 2 diabetes mellitus without complication, without long-term current use of insulin  Overview:  Diagnosed age 42 (August 2023), Hgb A1c 7.2    08/29/2023 - Ozempic 0.25 mg SQ weekly    November 2023 - Ozempic 1 mg weekly    Assessment & Plan:  Hemoglobin A1c back up to 6.5.  Continue Ozempic 1 mg weekly.  Refused addition of oral medication.  Discussed strict diabetic diet, follow-up 3 months.    Orders:  -     POCT HEMOGLOBIN A1C    2. Primary hypertension  Overview:  08/29/2023 - lisinopril 2.5 mg daily    Assessment & Plan:  Elevated x2 in clinic today, has not been taking lisinopril, wanted to see if her blood pressure was still high now that she has no longer in pain.  Restart lisinopril 2.5 mg daily, follow-up 3 months.    Orders:  -     lisinopriL (PRINIVIL,ZESTRIL) 2.5 MG tablet; Take 1 tablet (2.5 mg total) by mouth once daily.  Dispense: 90 tablet; Refill: 3    3. Morbid obesity  Overview:  08/29/2023 - 220 lb, BMI 43.08, start Ozempic  11/30/2023 - 205 lb, BMI 40.11  03/01/2024 - 195 lb, BMI 38.20    Assessment & Plan:  Improving, continue Ozempic for type 2 diabetes and obesity, follow-up 3 months.             Follow up in about 3 months (around 6/1/2024) for follow up Hgb A1c in office .

## 2024-03-01 NOTE — ASSESSMENT & PLAN NOTE
Elevated x2 in clinic today, has not been taking lisinopril, wanted to see if her blood pressure was still high now that she has no longer in pain.  Restart lisinopril 2.5 mg daily, follow-up 3 months.

## 2024-03-11 ENCOUNTER — TELEPHONE (OUTPATIENT)
Dept: FAMILY MEDICINE | Facility: CLINIC | Age: 44
End: 2024-03-11
Payer: COMMERCIAL

## 2024-03-11 NOTE — TELEPHONE ENCOUNTER
Pt called with c/o Ozempic costing her $800. States she was only paying $200/monthly and she is not sure why her insurance is not covering more. She states she was informed by the pharmacy that the coupon codes are not currently working since the systems were hacked a couple of weeks back, but even with the coupon it was only $100 off. I instructed pt to call her insurance to discuss with them as her PA is good until August of this year. Pt verbalized understanding and will let us know if something has changed.

## 2024-03-11 NOTE — TELEPHONE ENCOUNTER
----- Message from Laurie Ames sent at 3/11/2024 10:07 AM CDT -----  Regarding: Patient Call  .Type:  Patient Returning Call    Who Called:pt  Who Left Message for Patient:office staff  Does the patient know what this is regarding?:medication   Would the patient rather a call back or a response via [facility]?   Best Call Back Number:[phone]  Additional Information: pt takes her prescription for semaglutide (OZEMPIC) 1 mg/dose (4 mg/3 mL), is due today but her insurance want cover it and would like advice

## 2024-03-11 NOTE — TELEPHONE ENCOUNTER
----- Message from Zari Shabazz sent at 3/11/2024  2:06 PM CDT -----  .Type:  Patient Returning Call    Who Called:pt  Who Left Message for Patient:nurse   Does the patient know what this is regarding?:meds   Would the patient rather a call back or a response via [facility]? Call back   Best Call Back Number:[phone]  Additional Information:

## 2024-03-12 ENCOUNTER — TELEPHONE (OUTPATIENT)
Dept: FAMILY MEDICINE | Facility: CLINIC | Age: 44
End: 2024-03-12
Payer: COMMERCIAL

## 2024-03-12 DIAGNOSIS — E11.9 TYPE 2 DIABETES MELLITUS WITHOUT COMPLICATION, WITHOUT LONG-TERM CURRENT USE OF INSULIN: Primary | ICD-10-CM

## 2024-03-12 NOTE — TELEPHONE ENCOUNTER
Call from patient stating that she contacted her insurance about Ozempic.  She was notified that they will not cover her for weight loss with this medication.  She is requesting to change to Mounjoro.  The coupon cards are working for Mounjoro and her insurance will cover it for this dx.  [facility].

## 2024-03-12 NOTE — TELEPHONE ENCOUNTER
Spoke with pt and she states that she had spoken with [facility] and they told her that the Ozempic was not covered for her d/t her taking it for weight loss. Pt states she told them she is not taking it for weight loss and is taking it for DM. Insurance instructed her to call the [facility]. She states when she spoke with that Ozempic is covered for DM and she is not sure why [employer] told her that she was not covered. States she also only has $20 left to meet her deductible so she is not sure why the cost went from $200 to $800. Informed the pt that I would resubmit the PA just in case there was some type of issue and we would go from there.   PA resubmitted and shows the PA from August is still on file and good until 8/2024.

## 2024-04-16 ENCOUNTER — TELEPHONE (OUTPATIENT)
Dept: FAMILY MEDICINE | Facility: CLINIC | Age: 44
End: 2024-04-16
Payer: COMMERCIAL

## 2024-04-16 DIAGNOSIS — E11.9 TYPE 2 DIABETES MELLITUS WITHOUT COMPLICATION, WITHOUT LONG-TERM CURRENT USE OF INSULIN: Primary | ICD-10-CM

## 2024-04-16 NOTE — TELEPHONE ENCOUNTER
----- Message from Jeni Spear sent at 4/16/2024 12:02 PM CDT -----  Regarding: call back  .Type:  Needs Medical Advice    Who Called: pt  Symptoms (please be specific):    How long has patient had these symptoms:    Pharmacy name and phone #:    Would the patient rather a call back or a response via [facility]? Call back  Best Call Back Number: [phone]  Additional Information: pt requesting a call back about med

## 2024-04-16 NOTE — TELEPHONE ENCOUNTER
Spoke with patient, stated Mounjaro 7.5 is on back order. She has called 5 other pharmacies and they cannot get it as well. She is due to take her shot Friday

## 2024-04-16 NOTE — TELEPHONE ENCOUNTER
----- Message from Danial Cruz sent at 4/16/2024  1:24 PM CDT -----  .Type:  Needs Medical Advice    Who Called: Kathryn   Symptoms (please be specific):    How long has patient had these symptoms:    Pharmacy name and phone #:    Would the patient rather a call back or a response via [facility]?   Best Call Back Number: [phone]  Additional Information: Patient was returning a call she just missed from the nurse.

## 2024-05-30 ENCOUNTER — TELEPHONE (OUTPATIENT)
Dept: FAMILY MEDICINE | Facility: CLINIC | Age: 44
End: 2024-05-30
Payer: COMMERCIAL

## 2024-05-30 DIAGNOSIS — E11.9 TYPE 2 DIABETES MELLITUS WITHOUT COMPLICATION, WITHOUT LONG-TERM CURRENT USE OF INSULIN: Primary | ICD-10-CM

## 2024-05-30 NOTE — TELEPHONE ENCOUNTER
Are there any outstanding tasks in patient's chart?    labs  2. Do we have outstanding/pending referrals?    n  3. Has the patient been seen in an ER, Urgent Care, or admitted since last visit?  n    4. Has patient seen any other health care providers since last visit?  n    5.  Has patient had any blood work or x-rays done since last visit?   Patient will complete labs at [facility]

## 2024-06-04 ENCOUNTER — LAB VISIT (OUTPATIENT)
Dept: LAB | Facility: HOSPITAL | Age: 44
End: 2024-06-04
Attending: NURSE PRACTITIONER
Payer: COMMERCIAL

## 2024-06-04 DIAGNOSIS — E11.9 TYPE 2 DIABETES MELLITUS WITHOUT COMPLICATION, WITHOUT LONG-TERM CURRENT USE OF INSULIN: ICD-10-CM

## 2024-06-04 LAB
EST. AVERAGE GLUCOSE BLD GHB EST-MCNC: 105.4 MG/DL
HBA1C MFR BLD: 5.3 %

## 2024-06-04 PROCEDURE — 36415 COLL VENOUS BLD VENIPUNCTURE: CPT

## 2024-06-04 PROCEDURE — 83036 HEMOGLOBIN GLYCOSYLATED A1C: CPT

## 2024-06-06 ENCOUNTER — OFFICE VISIT (OUTPATIENT)
Dept: FAMILY MEDICINE | Facility: CLINIC | Age: 44
End: 2024-06-06
Payer: COMMERCIAL

## 2024-06-06 VITALS
BODY MASS INDEX: 36.12 KG/M2 | RESPIRATION RATE: 16 BRPM | WEIGHT: 184 LBS | HEIGHT: 60 IN | SYSTOLIC BLOOD PRESSURE: 136 MMHG | HEART RATE: 77 BPM | OXYGEN SATURATION: 99 % | DIASTOLIC BLOOD PRESSURE: 84 MMHG | TEMPERATURE: 98 F

## 2024-06-06 DIAGNOSIS — Z12.31 BREAST CANCER SCREENING BY MAMMOGRAM: ICD-10-CM

## 2024-06-06 DIAGNOSIS — Z00.00 ANNUAL PHYSICAL EXAM: ICD-10-CM

## 2024-06-06 DIAGNOSIS — R91.1 SOLITARY PULMONARY NODULE: ICD-10-CM

## 2024-06-06 DIAGNOSIS — E66.01 MORBID OBESITY: ICD-10-CM

## 2024-06-06 DIAGNOSIS — E11.9 TYPE 2 DIABETES MELLITUS WITHOUT COMPLICATION, WITHOUT LONG-TERM CURRENT USE OF INSULIN: Primary | ICD-10-CM

## 2024-06-06 DIAGNOSIS — R68.89 COLD INTOLERANCE: ICD-10-CM

## 2024-06-06 DIAGNOSIS — I10 PRIMARY HYPERTENSION: ICD-10-CM

## 2024-06-06 DIAGNOSIS — R91.8 PULMONARY NODULES: ICD-10-CM

## 2024-06-06 PROCEDURE — 4010F ACE/ARB THERAPY RXD/TAKEN: CPT | Mod: CPTII,,, | Performed by: NURSE PRACTITIONER

## 2024-06-06 PROCEDURE — 3008F BODY MASS INDEX DOCD: CPT | Mod: CPTII,,, | Performed by: NURSE PRACTITIONER

## 2024-06-06 PROCEDURE — 3075F SYST BP GE 130 - 139MM HG: CPT | Mod: CPTII,,, | Performed by: NURSE PRACTITIONER

## 2024-06-06 PROCEDURE — 1160F RVW MEDS BY RX/DR IN RCRD: CPT | Mod: CPTII,,, | Performed by: NURSE PRACTITIONER

## 2024-06-06 PROCEDURE — 1159F MED LIST DOCD IN RCRD: CPT | Mod: CPTII,,, | Performed by: NURSE PRACTITIONER

## 2024-06-06 PROCEDURE — 99214 OFFICE O/P EST MOD 30 MIN: CPT | Mod: ,,, | Performed by: NURSE PRACTITIONER

## 2024-06-06 PROCEDURE — 3079F DIAST BP 80-89 MM HG: CPT | Mod: CPTII,,, | Performed by: NURSE PRACTITIONER

## 2024-06-06 PROCEDURE — 3044F HG A1C LEVEL LT 7.0%: CPT | Mod: CPTII,,, | Performed by: NURSE PRACTITIONER

## 2024-06-06 RX ORDER — AMLODIPINE BESYLATE 5 MG/1
5 TABLET ORAL DAILY
Qty: 90 TABLET | Refills: 3 | Status: SHIPPED | OUTPATIENT
Start: 2024-06-06 | End: 2025-06-06

## 2024-06-06 RX ORDER — SEMAGLUTIDE 1.34 MG/ML
1 INJECTION, SOLUTION SUBCUTANEOUS
COMMUNITY
Start: 2024-05-17

## 2024-06-06 NOTE — PROGRESS NOTES
Subjective:       Patient ID: Kathryn Scruggs is a 43 y.o. female.    Chief Complaint: Hypertension (3m fu) and Diabetes (3m fu)      HPI   This is a 43-year-old white female who presents to clinic today for three-month follow-up for hypertension, diabetes.  Reports that she has been having a dry tickly cough now for months.  Just will not go away.  Also, is cold all the time.  Review of Systems  Comprehensive review of systems negative except as stated in HPI    The patient's Health Maintenance was reviewed and the following appears to be due:   Health Maintenance Due   Topic Date Due    Mammogram  2024    Eye Exam  2024       Past Medical History:  Past Medical History:   Diagnosis Date    ADHD (attention deficit hyperactivity disorder)     Depression     Pre-diabetes      Past Surgical History:   Procedure Laterality Date     SECTION  2003     SECTION  2010     Review of patient's allergies indicates:   Allergen Reactions    Bacitracin Rash     rash      Cortisone Rash     rash    Dexamethasone Rash    Neomycin-bacitracin-polymyxin Rash     rash       Current Outpatient Medications on File Prior to Visit   Medication Sig Dispense Refill    OZEMPIC 1 mg/dose (4 mg/3 mL) Inject 1 mg into the skin every 7 days.      [DISCONTINUED] lisinopriL (PRINIVIL,ZESTRIL) 2.5 MG tablet Take 1 tablet (2.5 mg total) by mouth once daily. 90 tablet 3    [DISCONTINUED] tirzepatide 10 mg/0.5 mL PnIj Inject 10 mg into the skin every 7 days. (Patient not taking: Reported on 2024) 4 Pen 11     No current facility-administered medications on file prior to visit.     Social History     Socioeconomic History    Marital status:    Tobacco Use    Smoking status: Former     Current packs/day: 0.00     Types: Cigarettes     Start date:      Quit date: 2009     Years since quittin.9    Smokeless tobacco: Never   Substance and Sexual Activity    Alcohol use: Not Currently      Comment: occasional    Drug use: Not Currently     Types: Marijuana    Sexual activity: Yes     Partners: Male     Family History   Problem Relation Name Age of Onset    No Known Problems Mother         Objective:       /84 (BP Location: Left arm)   Pulse 77   Temp 98.1 °F (36.7 °C) (Oral)   Resp 16   Ht 5' (1.524 m)   Wt 83.5 kg (184 lb)   SpO2 99%   BMI 35.94 kg/m²      Physical Exam  Vitals and nursing note reviewed.   Constitutional:       Appearance: Normal appearance. She is normal weight.   HENT:      Head: Normocephalic and atraumatic.      Right Ear: Tympanic membrane, ear canal and external ear normal.      Left Ear: Tympanic membrane, ear canal and external ear normal.      Nose: Nose normal.      Mouth/Throat:      Mouth: Mucous membranes are moist.      Pharynx: Oropharynx is clear.   Eyes:      Extraocular Movements: Extraocular movements intact.      Conjunctiva/sclera: Conjunctivae normal.      Pupils: Pupils are equal, round, and reactive to light.   Cardiovascular:      Rate and Rhythm: Normal rate and regular rhythm.      Heart sounds: Normal heart sounds.   Pulmonary:      Effort: Pulmonary effort is normal.      Breath sounds: Normal breath sounds.   Musculoskeletal:         General: Normal range of motion.      Cervical back: Normal range of motion and neck supple.   Skin:     General: Skin is warm and dry.   Neurological:      General: No focal deficit present.      Mental Status: She is alert and oriented to person, place, and time.   Psychiatric:         Mood and Affect: Mood normal.         Behavior: Behavior normal.         Thought Content: Thought content normal.         Judgment: Judgment normal.         Labs  Lab Visit on 06/04/2024   Component Date Value Ref Range Status    Hemoglobin A1c 06/04/2024 5.3  <=7.0 % Final    Estimated Average Glucose 06/04/2024 105.4  mg/dL Final   Office Visit on 03/01/2024   Component Date Value Ref Range Status    Hemoglobin A1C, POC  03/01/2024 6.5  % Final       Assessment and Plan       ICD-10-CM ICD-9-CM   1. Type 2 diabetes mellitus without complication, without long-term current use of insulin  E11.9 250.00   2. Breast cancer screening by mammogram  Z12.31 V76.12   3. Solitary pulmonary nodule  R91.1 793.11   4. Pulmonary nodules  R91.8 793.19   5. Primary hypertension  I10 401.9   6. Annual physical exam  Z00.00 V70.0   7. Cold intolerance  R68.89 780.99   8. Morbid obesity  E66.01 278.01        1. Type 2 diabetes mellitus without complication, without long-term current use of insulin  Overview:  Diagnosed age 42 (August 2023), Hgb A1c 7.2    08/29/2023 - Ozempic 0.25 mg SQ weekly    November 2023 - Ozempic 1 mg weekly    03/12/2024 - switch to Mounjaro 7.5 mg weekly     04/16/2024 - Mounjaro 10 mg weekly     Assessment & Plan:  Currently on Ozempic due to Mounjaro shortage, continue Ozempic 1 mg weekly for now, hemoglobin A1c stable at 5.3, follow-up 3 months.    Orders:  -     Hemoglobin A1C; Future; Expected date: 09/06/2024  -     Microalbumin/Creatinine Ratio, Urine; Future; Expected date: 09/06/2024  -     Diabetic Eye Screening Photo; Future; Expected date: 09/06/2024    2. Breast cancer screening by mammogram  Overview:  MMG yearly in August    Assessment & Plan:  Mammogram ordered to be completed in 3 months.    Orders:  -     Mammo Digital Screening Bilat w/ Jason; Future; Expected date: 09/06/2024    3. Solitary pulmonary nodule  -     CT Chest Without Contrast; Future; Expected date: 09/06/2024    4. Pulmonary nodules  Overview:  Incidentally noted on coronary artery calcium score CT completed 09/05/2023 - few small pulmonary nodules are of low suspicion.  Patient former smoker for approximately 14 years.  Will plan for repeat CT chest without contrast in September 2024 to establish stability.    Assessment & Plan:  Repeat CT chest without contrast ordered to be completed in September.      5. Primary  hypertension  Overview:  08/29/2023 - lisinopril 2.5 mg daily    06/06/2024 - discontinue lisinopril due to cough, start amlodipine 5 mg daily    Assessment & Plan:  Discontinue lisinopril due to cough, start amlodipine 5 mg daily, follow-up 3 months.    Orders:  -     amLODIPine (NORVASC) 5 MG tablet; Take 1 tablet (5 mg total) by mouth once daily.  Dispense: 90 tablet; Refill: 3    6. Annual physical exam  -     CBC Auto Differential; Future; Expected date: 09/06/2024  -     Comprehensive Metabolic Panel; Future; Expected date: 09/06/2024  -     Lipid Panel; Future; Expected date: 09/06/2024  -     TSH; Future; Expected date: 09/06/2024  -     Iron and TIBC; Future; Expected date: 09/06/2024    7. Cold intolerance  Comments:  Iron panel with wellness in 3 months  Orders:  -     Iron and TIBC; Future; Expected date: 09/06/2024    8. Morbid obesity  Overview:  08/29/2023 - 220 lb, BMI 43.08, start Ozempic  11/30/2023 - 205 lb, BMI 40.11  03/01/2024 - 195 lb, BMI 38.20  06/06/2024 - 184 lb, BMI 35.94    Assessment & Plan:  Improving, continue with lifestyle modification, continue Ozempic.  Follow-up 3 months.               Follow up in 3 months (on 9/4/2024) for Annual.

## 2024-06-06 NOTE — ASSESSMENT & PLAN NOTE
Currently on Ozempic due to Mounjaro shortage, continue Ozempic 1 mg weekly for now, hemoglobin A1c stable at 5.3, follow-up 3 months.

## 2024-07-31 ENCOUNTER — PATIENT MESSAGE (OUTPATIENT)
Dept: FAMILY MEDICINE | Facility: CLINIC | Age: 44
End: 2024-07-31
Payer: COMMERCIAL

## 2024-07-31 DIAGNOSIS — E11.9 TYPE 2 DIABETES MELLITUS WITHOUT COMPLICATION, WITHOUT LONG-TERM CURRENT USE OF INSULIN: Primary | ICD-10-CM

## 2024-08-01 RX ORDER — TIRZEPATIDE 7.5 MG/.5ML
7.5 INJECTION, SOLUTION SUBCUTANEOUS
Qty: 2 ML | Refills: 11 | Status: SHIPPED | OUTPATIENT
Start: 2024-08-01

## 2024-08-01 RX ORDER — TIRZEPATIDE 7.5 MG/.5ML
7.5 INJECTION, SOLUTION SUBCUTANEOUS
COMMUNITY
Start: 2024-07-02 | End: 2024-08-01 | Stop reason: SDUPTHER

## 2024-08-12 ENCOUNTER — PATIENT OUTREACH (OUTPATIENT)
Facility: CLINIC | Age: 44
End: 2024-08-12
Payer: COMMERCIAL

## 2024-08-12 NOTE — PROGRESS NOTES
Health Maintenance Topic(s) Outreach Outcomes & Actions Taken:    Primary Care Appt - Outreach Outcomes & Actions Taken  : Patent reminded of wellness with PCP on 9/4/2024    Lab(s) - Outreach Outcomes & Actions Taken  : Overdue Lab(s) Ordered and Patient labs ordered by PCP and states she will schedule them herself .    Medication Adherence / Statins - Outreach Outcomes & Actions Taken  : Primary Care Appt Scheduled and patient has wellness on 9/4/2024 and states will discuss statin meds with PCP at that time.    Breast Cancer Screening - Outreach Outcomes & Actions Taken  : Mammogram scheduled for 8/23/2024    Eye Exam - Outreach Outcomes & Actions Taken  : patient will do in office eye fundas photo on 9/4/2024                         Additional Notes:    Completed SDOH with patient - no needs       Care Management, Digital Medicine, and/or Education Referrals      Next Steps - Referral Actions: Digital Medicine Outcomes and Actions Taken: not eligible and No referrals sent patient stated no SDOH needs

## 2024-08-23 ENCOUNTER — HOSPITAL ENCOUNTER (OUTPATIENT)
Dept: RADIOLOGY | Facility: HOSPITAL | Age: 44
Discharge: HOME | End: 2024-08-23
Attending: NURSE PRACTITIONER
Payer: COMMERCIAL

## 2024-08-23 DIAGNOSIS — Z12.31 BREAST CANCER SCREENING BY MAMMOGRAM: ICD-10-CM

## 2024-08-23 DIAGNOSIS — R91.1 SOLITARY PULMONARY NODULE: ICD-10-CM

## 2024-08-23 PROCEDURE — 77063 BREAST TOMOSYNTHESIS BI: CPT | Mod: 26,,, | Performed by: RADIOLOGY

## 2024-08-23 PROCEDURE — 77067 SCR MAMMO BI INCL CAD: CPT | Mod: TC

## 2024-08-23 PROCEDURE — 71250 CT THORAX DX C-: CPT | Mod: TC

## 2024-08-23 PROCEDURE — 77067 SCR MAMMO BI INCL CAD: CPT | Mod: 26,,, | Performed by: RADIOLOGY

## 2024-08-27 ENCOUNTER — TELEPHONE (OUTPATIENT)
Dept: FAMILY MEDICINE | Facility: CLINIC | Age: 44
End: 2024-08-27
Payer: COMMERCIAL

## 2024-08-27 NOTE — TELEPHONE ENCOUNTER
Are there any outstanding tasks in patient's chart?    N  2. Do we have outstanding/pending referrals?    N  3. Has the patient been seen in an ER, Urgent Care, or admitted since last visit?    N  4. Has patient seen any other health care providers since last visit?    N  5.  Has patient had any blood work or x-rays done since last visit?   LABS COMPLETED

## 2024-09-03 NOTE — PROGRESS NOTES
Subjective:       Patient ID: Kathryn Scruggs is a 43 y.o. female.    Chief Complaint: Annual Exam      HPI   This is a 43-year-old white female who presents to clinic today for an annual wellness exam.  Patient reports overall doing well.  Does have some increased fatigue, feeling cold all the time.  Review of Systems  Comprehensive review of systems negative except as stated in HPI    The patient's Health Maintenance was reviewed and the following appears to be due:   Health Maintenance Due   Topic Date Due    Foot Exam  2024       Past Medical History:  Past Medical History:   Diagnosis Date    ADHD (attention deficit hyperactivity disorder)     Depression     Pre-diabetes      Past Surgical History:   Procedure Laterality Date     SECTION  2003     SECTION  2010     Review of patient's allergies indicates:   Allergen Reactions    Bacitracin Rash     rash      Cortisone Rash     rash    Dexamethasone Rash    Neomycin-bacitracin-polymyxin Rash     rash       Current Outpatient Medications on File Prior to Visit   Medication Sig Dispense Refill    amLODIPine (NORVASC) 5 MG tablet Take 1 tablet (5 mg total) by mouth once daily. 90 tablet 3    MOUNJARO 7.5 mg/0.5 mL PnIj Inject 7.5 mg into the skin every 7 days. 2 mL 11     No current facility-administered medications on file prior to visit.     Social History     Socioeconomic History    Marital status:    Tobacco Use    Smoking status: Former     Current packs/day: 0.00     Types: Cigarettes     Start date:      Quit date: 2009     Years since quitting: 15.1    Smokeless tobacco: Never   Substance and Sexual Activity    Alcohol use: Not Currently     Comment: occasional    Drug use: Not Currently     Types: Marijuana    Sexual activity: Yes     Partners: Male     Social Determinants of Health     Financial Resource Strain: Low Risk  (2024)    Overall Financial Resource Strain (CARDIA)     Difficulty of Paying  Living Expenses: Not hard at all   Food Insecurity: No Food Insecurity (8/12/2024)    Hunger Vital Sign     Worried About Running Out of Food in the Last Year: Never true     Ran Out of Food in the Last Year: Never true   Transportation Needs: No Transportation Needs (8/12/2024)    TRANSPORTATION NEEDS     Transportation : No   Stress: No Stress Concern Present (8/12/2024)    [employer] [place] [employer] - Occupational Stress Questionnaire     Feeling of Stress : Not at all   Housing Stability: Low Risk  (8/12/2024)    Housing Stability Vital Sign     Unable to Pay for Housing in the Last Year: No     Homeless in the Last Year: No     Family History   Problem Relation Name Age of Onset    No Known Problems Mother         Objective:       /78 (BP Location: Left arm)   Pulse 74   Temp 98.1 °F (36.7 °C) (Oral)   Resp 16   Ht 5' (1.524 m)   Wt 79.8 kg (176 lb)   LMP 08/27/2024   SpO2 99%   BMI 34.37 kg/m²      Physical Exam  Vitals and nursing note reviewed.   Constitutional:       Appearance: Normal appearance. She is obese.   HENT:      Head: Normocephalic and atraumatic.      Right Ear: Tympanic membrane, ear canal and external ear normal.      Left Ear: Tympanic membrane, ear canal and external ear normal.      Nose: Nose normal.      Mouth/Throat:      Mouth: Mucous membranes are moist.      Pharynx: Oropharynx is clear.   Eyes:      Extraocular Movements: Extraocular movements intact.      Conjunctiva/sclera: Conjunctivae normal.      Pupils: Pupils are equal, round, and reactive to light.   Cardiovascular:      Rate and Rhythm: Normal rate and regular rhythm.      Pulses: Normal pulses.           Dorsalis pedis pulses are 2+ on the right side and 2+ on the left side.        Posterior tibial pulses are 2+ on the right side and 2+ on the left side.      Heart sounds: Normal heart sounds.   Pulmonary:      Effort: Pulmonary effort is normal.      Breath sounds: Normal breath sounds.    Abdominal:      General: Abdomen is flat. Bowel sounds are normal.      Palpations: Abdomen is soft.   Musculoskeletal:         General: Normal range of motion.      Cervical back: Normal range of motion.   Feet:      Right foot:      Protective Sensation: 5 sites tested.  5 sites sensed.      Skin integrity: Skin integrity normal.      Toenail Condition: Right toenails are normal.      Left foot:      Protective Sensation: 5 sites tested.  5 sites sensed.      Skin integrity: Skin integrity normal.      Toenail Condition: Left toenails are normal.   Skin:     General: Skin is warm and dry.   Neurological:      General: No focal deficit present.      Mental Status: She is alert and oriented to person, place, and time.   Psychiatric:         Mood and Affect: Mood normal.         Behavior: Behavior normal.         Thought Content: Thought content normal.         Judgment: Judgment normal.         Labs  Lab Visit on 08/23/2024   Component Date Value Ref Range Status    Sodium 08/23/2024 136  136 - 145 mmol/L Final    Potassium 08/23/2024 4.0  3.5 - 5.1 mmol/L Final    Chloride 08/23/2024 104  98 - 107 mmol/L Final    CO2 08/23/2024 26  22 - 29 mmol/L Final    Glucose 08/23/2024 92  74 - 100 mg/dL Final    Blood Urea Nitrogen 08/23/2024 12.8  7.0 - 18.7 mg/dL Final    Creatinine 08/23/2024 0.83  0.55 - 1.02 mg/dL Final    Calcium 08/23/2024 9.1  8.4 - 10.2 mg/dL Final    Protein Total 08/23/2024 6.9  6.4 - 8.3 gm/dL Final    Albumin 08/23/2024 4.0  3.5 - 5.0 g/dL Final    Globulin 08/23/2024 2.9  2.4 - 3.5 gm/dL Final    Albumin/Globulin Ratio 08/23/2024 1.4  1.1 - 2.0 ratio Final    Bilirubin Total 08/23/2024 0.5  <=1.5 mg/dL Final    ALP 08/23/2024 58  40 - 150 unit/L Final    ALT 08/23/2024 9  0 - 55 unit/L Final    AST 08/23/2024 14  5 - 34 unit/L Final    eGFR 08/23/2024 >60  mL/min/1.73/m2 Final    Anion Gap 08/23/2024 6.0  mEq/L Final    BUN/Creatinine Ratio 08/23/2024 15   Final    Cholesterol Total 08/23/2024  219 (H)  <=200 mg/dL Final    HDL Cholesterol 08/23/2024 34 (L)  35 - 60 mg/dL Final    Triglyceride 08/23/2024 116  37 - 140 mg/dL Final    Cholesterol/HDL Ratio 08/23/2024 6 (H)  0 - 5 Final    Very Low Density Lipoprotein 08/23/2024 23   Final    LDL Cholesterol 08/23/2024 162.00 (H)  50.00 - 140.00 mg/dL Final    TSH 08/23/2024 1.984  0.350 - 4.940 uIU/mL Final    Hemoglobin A1c 08/23/2024 4.7  <=7.0 % Final    Estimated Average Glucose 08/23/2024 88.2  mg/dL Final    Urine Microalbumin 08/23/2024 9.0  <=30.0 ug/mL Final    Urine Creatinine 08/23/2024 150.1 (H)  45.0 - 106.0 mg/dL Final    Microalbumin Creatinine Ratio 08/23/2024 6.0  0.0 - 30.0 mg/gm Cr Final    Iron Binding Capacity Unsaturated 08/23/2024 265  70 - 310 ug/dL Final    Iron Level 08/23/2024 37 (L)  50 - 170 ug/dL Final    Transferrin 08/23/2024 285  180 - 382 mg/dL Final    Iron Binding Capacity Total 08/23/2024 302  250 - 450 ug/dL Final    Iron Saturation 08/23/2024 12 (L)  20 - 50 % Final    WBC 08/23/2024 6.42  4.50 - 11.50 x10(3)/mcL Final    RBC 08/23/2024 4.13 (L)  4.20 - 5.40 x10(6)/mcL Final    Hgb 08/23/2024 11.2 (L)  12.0 - 16.0 g/dL Final    Hct 08/23/2024 33.6 (L)  37.0 - 47.0 % Final    MCV 08/23/2024 81.4  80.0 - 94.0 fL Final    MCH 08/23/2024 27.1  27.0 - 31.0 pg Final    MCHC 08/23/2024 33.3  33.0 - 36.0 g/dL Final    RDW 08/23/2024 13.7  11.5 - 17.0 % Final    Platelet 08/23/2024 279  130 - 400 x10(3)/mcL Final    MPV 08/23/2024 9.1  7.4 - 10.4 fL Final    Neut % 08/23/2024 57.9  % Final    Lymph % 08/23/2024 33.8  % Final    Mono % 08/23/2024 7.2  % Final    Eos % 08/23/2024 0.3  % Final    Basophil % 08/23/2024 0.5  % Final    Lymph # 08/23/2024 2.17  0.6 - 4.6 x10(3)/mcL Final    Neut # 08/23/2024 3.72  2.1 - 9.2 x10(3)/mcL Final    Mono # 08/23/2024 0.46  0.1 - 1.3 x10(3)/mcL Final    Eos # 08/23/2024 0.02  0 - 0.9 x10(3)/mcL Final    Baso # 08/23/2024 0.03  <=0.2 x10(3)/mcL Final    IG# 08/23/2024 0.02  0 - 0.04  x10(3)/mcL Final    IG% 08/23/2024 0.3  % Final   Lab Visit on 06/04/2024   Component Date Value Ref Range Status    Hemoglobin A1c 06/04/2024 5.3  <=7.0 % Final    Estimated Average Glucose 06/04/2024 105.4  mg/dL Final       Assessment and Plan       ICD-10-CM ICD-9-CM   1. Annual physical exam  Z00.00 V70.0   2. Pulmonary nodules  R91.8 793.19   3. Primary hypertension  I10 401.9   4. Mixed hyperlipidemia  E78.2 272.2   5. Breast cancer screening by mammogram  Z12.31 V76.12   6. Type 2 diabetes mellitus without complication, without long-term current use of insulin  E11.9 250.00   7. Morbid obesity  E66.01 278.01   8. PAUL (obstructive sleep apnea)  G47.33 327.23   9. Iron deficiency anemia, unspecified iron deficiency anemia type  D50.9 280.9        1. Annual physical exam  Overview:  Annual exam yearly in September      2. Pulmonary nodules  Overview:  Incidentally noted on coronary artery calcium score CT completed 09/05/2023 - few small pulmonary nodules are of low suspicion.  Patient former smoker for approximately 14 years.  Will plan for repeat CT chest without contrast in September 2024 to establish stability.    08/23/2024 - CT chest - no significant abnormality, no pulmonary nodule    Assessment & Plan:  Repeat CT chest with no nodule.  No need to repeat      3. Primary hypertension  Overview:  08/29/2023 - lisinopril 2.5 mg daily    06/06/2024 - discontinue lisinopril due to cough, start amlodipine 5 mg daily    Assessment & Plan:  Stable, continue amlodipine, follow-up 3 months.      4. Mixed hyperlipidemia  Assessment & Plan:  Total cholesterol 219, .  Start rosuvastatin 10 mg daily, follow-up with repeat lipid panel in 3 months    Orders:  -     rosuvastatin (CRESTOR) 10 MG tablet; Take 1 tablet (10 mg total) by mouth once daily.  Dispense: 90 tablet; Refill: 3  -     Lipid Panel; Future; Expected date: 12/04/2024    5. Breast cancer screening by mammogram  Overview:  MMG yearly in  August    Assessment & Plan:  Recent mammogram normal, follow-up 1 year.      6. Type 2 diabetes mellitus without complication, without long-term current use of insulin  Overview:  Diagnosed age 42 (August 2023), Hgb A1c 7.2    08/29/2023 - Ozempic 0.25 mg SQ weekly    November 2023 - Ozempic 1 mg weekly    03/12/2024 - switch to Mounjaro 7.5 mg weekly     04/16/2024 - Mounjaro 10 mg weekly     06/05/2024 - Mounjaro 7.5 mg weekly    Assessment & Plan:  Stable, hemoglobin A1c 4.7.  Continue Mounjaro 7.5 mg weekly, follow-up 3 months.    Orders:  -     Comprehensive Metabolic Panel; Future; Expected date: 12/04/2024  -     Hemoglobin A1C; Future; Expected date: 12/04/2024    7. Morbid obesity  Overview:  08/29/2023 - 220 lb, BMI 43.08, start Ozempic  11/30/2023 - 205 lb, BMI 40.11  03/01/2024 - 195 lb, BMI 38.20  06/06/2024 - 184 lb, BMI 35.94  09/04/2024 - 176 lb, BMI 34.37    Assessment & Plan:  Improving, continue with lifestyle modification and Mounjaro, follow-up 3 months.      8. PAUL (obstructive sleep apnea)  Overview:  Home sleep study completed with [facility] 08/22/2023  AHI 25.2  Min SpO2 89%  AutoPap 5-20 cmH20 ([facility])    Assessment & Plan:  Patient thought she did not need the auto PAP anymore due to losing weight and no longer snoring.  Complaining of excessive daytime sleepiness.  Instructed to restart auto PAP nightly, follow-up 3 months.      9. Iron deficiency anemia, unspecified iron deficiency anemia type  Assessment & Plan:  History of mild iron deficiency anemia due to heavy menstrual cycles.  Recently donated blood, has been extremely tired and fatigued lately.  Iron 37, iron saturation 12.  Injectafer x2 infusions ordered, will repeat a CBC and iron panel in 3 months.  Encouraged not to donate blood anymore.    Orders:  -     CBC Auto Differential; Future; Expected date: 12/04/2024  -     Iron and TIBC; Future; Expected date: 12/04/2024    Other orders  -     ferric carboxymaltose  (INJECTAFER) 750 mg in 0.9% NaCl 265 mL infusion  -     0.9%  NaCl infusion  -     EPINEPHrine (EPIPEN) 0.3 mg/0.3 mL pen injection 0.3 mg  -     diphenhydrAMINE injection 50 mg  -     sodium chloride 0.9% flush 10 mL  -     heparin, porcine (PF) 100 unit/mL injection flush 500 Units  -     0.9% NaCl 100 mL flush bag           Follow up in about 3 months (around 12/4/2024) for follow up.

## 2024-09-04 ENCOUNTER — CLINICAL SUPPORT (OUTPATIENT)
Dept: FAMILY MEDICINE | Facility: CLINIC | Age: 44
End: 2024-09-04
Attending: NURSE PRACTITIONER
Payer: COMMERCIAL

## 2024-09-04 ENCOUNTER — OFFICE VISIT (OUTPATIENT)
Dept: FAMILY MEDICINE | Facility: CLINIC | Age: 44
End: 2024-09-04
Payer: COMMERCIAL

## 2024-09-04 VITALS
HEART RATE: 74 BPM | SYSTOLIC BLOOD PRESSURE: 124 MMHG | OXYGEN SATURATION: 99 % | DIASTOLIC BLOOD PRESSURE: 78 MMHG | HEIGHT: 60 IN | RESPIRATION RATE: 16 BRPM | WEIGHT: 176 LBS | TEMPERATURE: 98 F | BODY MASS INDEX: 34.55 KG/M2

## 2024-09-04 DIAGNOSIS — E66.01 MORBID OBESITY: ICD-10-CM

## 2024-09-04 DIAGNOSIS — E78.2 MIXED HYPERLIPIDEMIA: ICD-10-CM

## 2024-09-04 DIAGNOSIS — I10 PRIMARY HYPERTENSION: ICD-10-CM

## 2024-09-04 DIAGNOSIS — G47.33 OSA (OBSTRUCTIVE SLEEP APNEA): ICD-10-CM

## 2024-09-04 DIAGNOSIS — E11.9 TYPE 2 DIABETES MELLITUS WITHOUT COMPLICATION, WITHOUT LONG-TERM CURRENT USE OF INSULIN: ICD-10-CM

## 2024-09-04 DIAGNOSIS — D50.9 IRON DEFICIENCY ANEMIA, UNSPECIFIED IRON DEFICIENCY ANEMIA TYPE: ICD-10-CM

## 2024-09-04 DIAGNOSIS — Z12.31 BREAST CANCER SCREENING BY MAMMOGRAM: ICD-10-CM

## 2024-09-04 DIAGNOSIS — R91.8 PULMONARY NODULES: ICD-10-CM

## 2024-09-04 DIAGNOSIS — Z00.00 ANNUAL PHYSICAL EXAM: Primary | ICD-10-CM

## 2024-09-04 PROCEDURE — 1160F RVW MEDS BY RX/DR IN RCRD: CPT | Mod: CPTII,,, | Performed by: NURSE PRACTITIONER

## 2024-09-04 PROCEDURE — 99396 PREV VISIT EST AGE 40-64: CPT | Mod: ,,, | Performed by: NURSE PRACTITIONER

## 2024-09-04 PROCEDURE — 3044F HG A1C LEVEL LT 7.0%: CPT | Mod: CPTII,,, | Performed by: NURSE PRACTITIONER

## 2024-09-04 PROCEDURE — 3061F NEG MICROALBUMINURIA REV: CPT | Mod: CPTII,,, | Performed by: NURSE PRACTITIONER

## 2024-09-04 PROCEDURE — 3078F DIAST BP <80 MM HG: CPT | Mod: CPTII,,, | Performed by: NURSE PRACTITIONER

## 2024-09-04 PROCEDURE — 3008F BODY MASS INDEX DOCD: CPT | Mod: CPTII,,, | Performed by: NURSE PRACTITIONER

## 2024-09-04 PROCEDURE — 3074F SYST BP LT 130 MM HG: CPT | Mod: CPTII,,, | Performed by: NURSE PRACTITIONER

## 2024-09-04 PROCEDURE — 92228 IMG RTA DETC/MNTR DS PHY/QHP: CPT | Mod: TC,,, | Performed by: INTERNAL MEDICINE

## 2024-09-04 PROCEDURE — 3066F NEPHROPATHY DOC TX: CPT | Mod: CPTII,,, | Performed by: NURSE PRACTITIONER

## 2024-09-04 PROCEDURE — 1159F MED LIST DOCD IN RCRD: CPT | Mod: CPTII,,, | Performed by: NURSE PRACTITIONER

## 2024-09-04 PROCEDURE — 4010F ACE/ARB THERAPY RXD/TAKEN: CPT | Mod: CPTII,,, | Performed by: NURSE PRACTITIONER

## 2024-09-04 RX ORDER — SODIUM CHLORIDE 9 MG/ML
INJECTION, SOLUTION INTRAVENOUS CONTINUOUS
OUTPATIENT
Start: 2024-09-04

## 2024-09-04 RX ORDER — HEPARIN 100 UNIT/ML
5 SYRINGE INTRAVENOUS
OUTPATIENT
Start: 2024-09-04

## 2024-09-04 RX ORDER — ROSUVASTATIN CALCIUM 10 MG/1
10 TABLET, COATED ORAL DAILY
Qty: 90 TABLET | Refills: 3 | Status: SHIPPED | OUTPATIENT
Start: 2024-09-04 | End: 2025-09-04

## 2024-09-04 RX ORDER — SODIUM CHLORIDE 0.9 % (FLUSH) 0.9 %
10 SYRINGE (ML) INJECTION
OUTPATIENT
Start: 2024-09-04

## 2024-09-04 RX ORDER — DIPHENHYDRAMINE HYDROCHLORIDE 50 MG/ML
50 INJECTION INTRAMUSCULAR; INTRAVENOUS ONCE AS NEEDED
OUTPATIENT
Start: 2024-09-04

## 2024-09-04 RX ORDER — EPINEPHRINE 0.3 MG/.3ML
0.3 INJECTION SUBCUTANEOUS ONCE AS NEEDED
OUTPATIENT
Start: 2024-09-04

## 2024-09-04 NOTE — PATIENT INSTRUCTIONS
The Scoop on Statins: Things You Should Know.       Heart disease is the number one cause of death in the [place]. Around 1 in 20 adults aged 20 and older have coronary artery disease [1] and every 33 seconds a person dies from cardiovascular disease in the [place] [2]. The good news is that you can reduce your risk.    What are statins and how do they work?    Statins are a medication that is effective at lowering cholesterol and your risk of heart attack and stroke.     Statins lower low-density lipoprotein (LDL) cholesterol known as the bad cholesterol. Over time, just like the pipes in your house, your arteries or pipes can become filled with waxy cholesterol plaques that can build up and block blood flow to your heart. This can lead to heart attacks and strokes. Statins can draw the cholesterol out of plaques and slow down the production of cholesterol in the liver.       Figure 1: Reproduced from: What Is Atherosclerosis? National Heart, Lung, and Blood [place]. Available at http://www.nhlbi.nih.gov/health/health-topics/topics/atherosclerosis/.    What are the benefits and potential risks of statins?    Statins can decrease the risk of major heart events by 31% and have a 21% decrease in death from heart disease [4]. In addition to lowering bad cholesterol, status increase your HDL or good cholesterol. They also are known to have anti-inflammatory properties, decrease the risk of blood clots, and improve the lining of blood vessels [5].     Statins are safe and effective for most people, but there are some rare side effects that impact a small number of patients. Most patients do not experience any side effects and up to 90% of patients do not experience muscle aches. Statins may also mildly increase glucose levels [6]. If a patient experiences side effects your doctor might try a different statin, lower dose, or decrease the frequency.     There are some common misconceptions about the  potential side effects and risks of taking statins which have been debunked in scientific studies. Common myths include statins having a negative effect on liver health and cognitive health. Scientific studies monitoring thousands of patients have shown that statins do not cause dementia. Use of statins can improve your long-term cognitive and overall health by reducing your risk of stroke and heart disease.     Who are prescribed statins?    Statins are used to treat patients with high cholesterol and many doctors prescribe statins for people at risk of heart disease and stroke. Statins are also recommended for patients with diabetes, those with elevated coronary calcium scores and patients with a history of stroke, heart attack, and other cardiovascular events.     Talk to your doctor about your risk and whether statins might be right for you.    References    1.   Autumn CW, Familia AW, Chele ZI, Costa CAM, Celestina P, Sohail CL, Michelle CM, Nasir AZ, Garland AK, Rashawn AE, Disney-Carlos Y, Elelena MSV, Renea KR, Bart-Damianm C, Eddie S, Tammy G, Janelle DG, Hirematmanda S, Mauri JE, Cece R, Sunshine DS, Madhav D, Andrew DA, Merlin J, Ma J, Magnani JW, Rosalva ED, Leonor ME, Xena SD, Beatriz NI, Alexandria R, Wolfgang M, Ирина GA, Chris NS, On MP, Marty EL, Javon SS, Estephanie JH, Marvin NY, Jet ND, Jet SS, Olaf K, Andre SS; American Heart Association [place] on Epidemiology and Prevention Statistics Committee and Stroke Statistics Subcommittee. Heart Disease and Stroke Statistics-2023 Update: A Report From the American Heart Association. Circulation. 2023 Feb 21;147(8):b85-a537. doi: 10.1161/CIR.[id number]. Epub 2023 Jan 25. Erratum in: Circulation. 2023 Feb 21;147(8):e622. Erratum in: Circulation. 2023 Jul 25;148(4):e4. PMID: 96349340.  2. National Center for Health Statistics. Multiple Cause of Death 8400-8821 on [facility] WONDER Database. Accessed October 28, 2023.  3. Valerie Spencer Macreadie I.  Exploitation of Aspergillus terreus for the Production of Natural Statins. J Fungi (Basel). 2016 Apr 30;2(2):13. doi: 10.3390/eal3740051. PMID: 07348193; PMCID: XLP5637879.  4. David SEBASTIAN, Shakir J, Lukas S. Effect of statins on risk of coronary disease: a meta-analysis of randomized controlled trials. LINDEN. 1999 Dec 22-29;282(24):2340-6. doi: 10.1001/linden.282.24.2340. PMID: 22226307.  5. Nalini-Gregory I, Casal-Bryan M, Dony AL. Statins: pros and cons. Med Clin (Barc). 2018 May 23;150(10):398-402. doi: 10.1016/j.medcli.2017.11.030. Epub 2017 Dec 29. PMID: 42070691; PMCID: TWV8146170.  6. Rome S, Luisa A, Luisa S. Statin Therapy: Review of Safety and Potential Side Effects. Acta Cardiol Sin. 2016 Nov;32(6):631-639. doi: 10.6515/wyb56496082u. PMID: 70690177; PMCID: BSC3508201.

## 2024-09-04 NOTE — ASSESSMENT & PLAN NOTE
Patient thought she did not need the auto PAP anymore due to losing weight and no longer snoring.  Complaining of excessive daytime sleepiness.  Instructed to restart auto PAP nightly, follow-up 3 months.

## 2024-09-04 NOTE — PROGRESS NOTES
Kathryn Scruggs is a 43 y.o. female here for a diabetic eye screening with non-dilated fundus photos per QUINTON ROBLEDO.    Patient cooperative?: Yes  Small pupils?: Yes  Last eye exam: 8/30/23    For exam results, see Encounter Report.

## 2024-09-04 NOTE — ASSESSMENT & PLAN NOTE
History of mild iron deficiency anemia due to heavy menstrual cycles.  Recently donated blood, has been extremely tired and fatigued lately.  Iron 37, iron saturation 12.  Injectafer x2 infusions ordered, will repeat a CBC and iron panel in 3 months.  Encouraged not to donate blood anymore.

## 2024-09-04 NOTE — ASSESSMENT & PLAN NOTE
Total cholesterol 219, .  Start rosuvastatin 10 mg daily, follow-up with repeat lipid panel in 3 months

## 2024-09-16 ENCOUNTER — INFUSION (OUTPATIENT)
Dept: INFUSION THERAPY | Facility: HOSPITAL | Age: 44
End: 2024-09-16
Attending: NURSE PRACTITIONER
Payer: COMMERCIAL

## 2024-09-16 VITALS
HEIGHT: 60 IN | TEMPERATURE: 98 F | SYSTOLIC BLOOD PRESSURE: 126 MMHG | BODY MASS INDEX: 34.54 KG/M2 | RESPIRATION RATE: 18 BRPM | DIASTOLIC BLOOD PRESSURE: 77 MMHG | WEIGHT: 175.94 LBS | HEART RATE: 73 BPM | OXYGEN SATURATION: 100 %

## 2024-09-16 DIAGNOSIS — D50.9 IRON DEFICIENCY ANEMIA, UNSPECIFIED IRON DEFICIENCY ANEMIA TYPE: Primary | ICD-10-CM

## 2024-09-16 PROCEDURE — 25000003 PHARM REV CODE 250: Performed by: NURSE PRACTITIONER

## 2024-09-16 PROCEDURE — A4216 STERILE WATER/SALINE, 10 ML: HCPCS | Performed by: NURSE PRACTITIONER

## 2024-09-16 PROCEDURE — 96365 THER/PROPH/DIAG IV INF INIT: CPT

## 2024-09-16 PROCEDURE — 63600175 PHARM REV CODE 636 W HCPCS: Mod: JZ,TB | Performed by: NURSE PRACTITIONER

## 2024-09-16 RX ORDER — SODIUM CHLORIDE 9 MG/ML
INJECTION, SOLUTION INTRAVENOUS CONTINUOUS
OUTPATIENT
Start: 2024-09-23

## 2024-09-16 RX ORDER — HEPARIN 100 UNIT/ML
5 SYRINGE INTRAVENOUS
Status: DISCONTINUED | OUTPATIENT
Start: 2024-09-16 | End: 2024-09-16 | Stop reason: HOSPADM

## 2024-09-16 RX ORDER — SODIUM CHLORIDE 0.9 % (FLUSH) 0.9 %
10 SYRINGE (ML) INJECTION
OUTPATIENT
Start: 2024-09-23

## 2024-09-16 RX ORDER — HEPARIN 100 UNIT/ML
5 SYRINGE INTRAVENOUS
OUTPATIENT
Start: 2024-09-23

## 2024-09-16 RX ORDER — SODIUM CHLORIDE 9 MG/ML
INJECTION, SOLUTION INTRAVENOUS CONTINUOUS
Status: DISCONTINUED | OUTPATIENT
Start: 2024-09-16 | End: 2024-09-16 | Stop reason: HOSPADM

## 2024-09-16 RX ORDER — DIPHENHYDRAMINE HYDROCHLORIDE 50 MG/ML
50 INJECTION INTRAMUSCULAR; INTRAVENOUS ONCE AS NEEDED
Status: DISCONTINUED | OUTPATIENT
Start: 2024-09-16 | End: 2024-09-16 | Stop reason: HOSPADM

## 2024-09-16 RX ORDER — EPINEPHRINE 0.3 MG/.3ML
0.3 INJECTION SUBCUTANEOUS ONCE AS NEEDED
Status: DISCONTINUED | OUTPATIENT
Start: 2024-09-16 | End: 2024-09-16 | Stop reason: HOSPADM

## 2024-09-16 RX ORDER — EPINEPHRINE 0.3 MG/.3ML
0.3 INJECTION SUBCUTANEOUS ONCE AS NEEDED
OUTPATIENT
Start: 2024-09-23

## 2024-09-16 RX ORDER — SODIUM CHLORIDE 0.9 % (FLUSH) 0.9 %
10 SYRINGE (ML) INJECTION
Status: DISCONTINUED | OUTPATIENT
Start: 2024-09-16 | End: 2024-09-16 | Stop reason: HOSPADM

## 2024-09-16 RX ORDER — DIPHENHYDRAMINE HYDROCHLORIDE 50 MG/ML
50 INJECTION INTRAMUSCULAR; INTRAVENOUS ONCE AS NEEDED
OUTPATIENT
Start: 2024-09-23

## 2024-09-16 RX ADMIN — FERRIC CARBOXYMALTOSE INJECTION 750 MG: 50 INJECTION, SOLUTION INTRAVENOUS at 12:09

## 2024-09-16 RX ADMIN — Medication 20 ML: at 12:09

## 2024-09-23 ENCOUNTER — INFUSION (OUTPATIENT)
Dept: INFUSION THERAPY | Facility: HOSPITAL | Age: 44
End: 2024-09-23
Attending: NURSE PRACTITIONER
Payer: COMMERCIAL

## 2024-09-23 VITALS
WEIGHT: 175.94 LBS | OXYGEN SATURATION: 100 % | DIASTOLIC BLOOD PRESSURE: 79 MMHG | TEMPERATURE: 99 F | RESPIRATION RATE: 18 BRPM | HEART RATE: 66 BPM | SYSTOLIC BLOOD PRESSURE: 121 MMHG | BODY MASS INDEX: 34.54 KG/M2 | HEIGHT: 60 IN

## 2024-09-23 DIAGNOSIS — D50.9 IRON DEFICIENCY ANEMIA, UNSPECIFIED IRON DEFICIENCY ANEMIA TYPE: Primary | ICD-10-CM

## 2024-09-23 PROCEDURE — 25000003 PHARM REV CODE 250: Performed by: NURSE PRACTITIONER

## 2024-09-23 PROCEDURE — 96365 THER/PROPH/DIAG IV INF INIT: CPT

## 2024-09-23 PROCEDURE — A4216 STERILE WATER/SALINE, 10 ML: HCPCS | Performed by: NURSE PRACTITIONER

## 2024-09-23 PROCEDURE — 63600175 PHARM REV CODE 636 W HCPCS: Mod: JZ,TB | Performed by: NURSE PRACTITIONER

## 2024-09-23 RX ORDER — EPINEPHRINE 0.3 MG/.3ML
0.3 INJECTION SUBCUTANEOUS ONCE AS NEEDED
OUTPATIENT
Start: 2024-09-23

## 2024-09-23 RX ORDER — HEPARIN 100 UNIT/ML
5 SYRINGE INTRAVENOUS
Status: DISCONTINUED | OUTPATIENT
Start: 2024-09-23 | End: 2024-09-23 | Stop reason: HOSPADM

## 2024-09-23 RX ORDER — SODIUM CHLORIDE 0.9 % (FLUSH) 0.9 %
10 SYRINGE (ML) INJECTION
Status: CANCELLED | OUTPATIENT
Start: 2024-09-23

## 2024-09-23 RX ORDER — HEPARIN 100 UNIT/ML
5 SYRINGE INTRAVENOUS
Status: CANCELLED | OUTPATIENT
Start: 2024-09-23

## 2024-09-23 RX ORDER — SODIUM CHLORIDE 9 MG/ML
INJECTION, SOLUTION INTRAVENOUS CONTINUOUS
Status: CANCELLED | OUTPATIENT
Start: 2024-09-23

## 2024-09-23 RX ORDER — SODIUM CHLORIDE 9 MG/ML
INJECTION, SOLUTION INTRAVENOUS CONTINUOUS
Status: DISCONTINUED | OUTPATIENT
Start: 2024-09-23 | End: 2024-09-23 | Stop reason: HOSPADM

## 2024-09-23 RX ORDER — DIPHENHYDRAMINE HYDROCHLORIDE 50 MG/ML
50 INJECTION INTRAMUSCULAR; INTRAVENOUS ONCE AS NEEDED
OUTPATIENT
Start: 2024-09-23

## 2024-09-23 RX ORDER — SODIUM CHLORIDE 0.9 % (FLUSH) 0.9 %
10 SYRINGE (ML) INJECTION
Status: DISCONTINUED | OUTPATIENT
Start: 2024-09-23 | End: 2024-09-23 | Stop reason: HOSPADM

## 2024-09-23 RX ADMIN — Medication 20 ML: at 01:09

## 2024-09-23 RX ADMIN — FERRIC CARBOXYMALTOSE INJECTION 750 MG: 50 INJECTION, SOLUTION INTRAVENOUS at 12:09

## 2024-10-14 ENCOUNTER — PATIENT MESSAGE (OUTPATIENT)
Dept: FAMILY MEDICINE | Facility: CLINIC | Age: 44
End: 2024-10-14
Payer: COMMERCIAL

## 2024-11-26 ENCOUNTER — TELEPHONE (OUTPATIENT)
Dept: FAMILY MEDICINE | Facility: CLINIC | Age: 44
End: 2024-11-26
Payer: COMMERCIAL

## 2024-11-26 NOTE — TELEPHONE ENCOUNTER
Attempted to contact patient for previsit on 11/26/24 at 9:45. LV reminding patient about her upcoming visit with labs before. Message sent to portal.

## 2024-12-03 ENCOUNTER — LAB VISIT (OUTPATIENT)
Dept: LAB | Facility: HOSPITAL | Age: 44
End: 2024-12-03
Attending: NURSE PRACTITIONER
Payer: COMMERCIAL

## 2024-12-03 DIAGNOSIS — D50.9 IRON DEFICIENCY ANEMIA, UNSPECIFIED IRON DEFICIENCY ANEMIA TYPE: ICD-10-CM

## 2024-12-03 DIAGNOSIS — E78.2 MIXED HYPERLIPIDEMIA: ICD-10-CM

## 2024-12-03 DIAGNOSIS — E11.9 TYPE 2 DIABETES MELLITUS WITHOUT COMPLICATION, WITHOUT LONG-TERM CURRENT USE OF INSULIN: ICD-10-CM

## 2024-12-03 LAB
ALBUMIN SERPL-MCNC: 4.1 G/DL (ref 3.5–5)
ALBUMIN/GLOB SERPL: 1.3 RATIO (ref 1.1–2)
ALP SERPL-CCNC: 59 UNIT/L (ref 40–150)
ALT SERPL-CCNC: 14 UNIT/L (ref 0–55)
ANION GAP SERPL CALC-SCNC: 6 MEQ/L
AST SERPL-CCNC: 12 UNIT/L (ref 5–34)
BASOPHILS # BLD AUTO: 0.02 X10(3)/MCL
BASOPHILS NFR BLD AUTO: 0.3 %
BILIRUB SERPL-MCNC: 0.5 MG/DL
BUN SERPL-MCNC: 14.2 MG/DL (ref 7–18.7)
CALCIUM SERPL-MCNC: 9.3 MG/DL (ref 8.4–10.2)
CHLORIDE SERPL-SCNC: 104 MMOL/L (ref 98–107)
CHOLEST SERPL-MCNC: 158 MG/DL
CHOLEST/HDLC SERPL: 4 {RATIO} (ref 0–5)
CO2 SERPL-SCNC: 25 MMOL/L (ref 22–29)
CREAT SERPL-MCNC: 0.73 MG/DL (ref 0.55–1.02)
CREAT/UREA NIT SERPL: 19
EOSINOPHIL # BLD AUTO: 0.01 X10(3)/MCL (ref 0–0.9)
EOSINOPHIL NFR BLD AUTO: 0.1 %
ERYTHROCYTE [DISTWIDTH] IN BLOOD BY AUTOMATED COUNT: 14.6 % (ref 11.5–17)
EST. AVERAGE GLUCOSE BLD GHB EST-MCNC: 96.8 MG/DL
GFR SERPLBLD CREATININE-BSD FMLA CKD-EPI: >60 ML/MIN/1.73/M2
GLOBULIN SER-MCNC: 3.2 GM/DL (ref 2.4–3.5)
GLUCOSE SERPL-MCNC: 93 MG/DL (ref 74–100)
HBA1C MFR BLD: 5 %
HCT VFR BLD AUTO: 42.1 % (ref 37–47)
HDLC SERPL-MCNC: 38 MG/DL (ref 35–60)
HGB BLD-MCNC: 14 G/DL (ref 12–16)
IMM GRANULOCYTES # BLD AUTO: 0.02 X10(3)/MCL (ref 0–0.04)
IMM GRANULOCYTES NFR BLD AUTO: 0.3 %
IRON SATN MFR SERPL: 25 % (ref 20–50)
IRON SERPL-MCNC: 67 UG/DL (ref 50–170)
LDLC SERPL CALC-MCNC: 98 MG/DL (ref 50–140)
LYMPHOCYTES # BLD AUTO: 1.88 X10(3)/MCL (ref 0.6–4.6)
LYMPHOCYTES NFR BLD AUTO: 27.2 %
MCH RBC QN AUTO: 27.6 PG (ref 27–31)
MCHC RBC AUTO-ENTMCNC: 33.3 G/DL (ref 33–36)
MCV RBC AUTO: 83 FL (ref 80–94)
MONOCYTES # BLD AUTO: 0.66 X10(3)/MCL (ref 0.1–1.3)
MONOCYTES NFR BLD AUTO: 9.6 %
NEUTROPHILS # BLD AUTO: 4.32 X10(3)/MCL (ref 2.1–9.2)
NEUTROPHILS NFR BLD AUTO: 62.5 %
PLATELET # BLD AUTO: 245 X10(3)/MCL (ref 130–400)
PMV BLD AUTO: 8.9 FL (ref 7.4–10.4)
POTASSIUM SERPL-SCNC: 4.4 MMOL/L (ref 3.5–5.1)
PROT SERPL-MCNC: 7.3 GM/DL (ref 6.4–8.3)
RBC # BLD AUTO: 5.07 X10(6)/MCL (ref 4.2–5.4)
SODIUM SERPL-SCNC: 135 MMOL/L (ref 136–145)
TIBC SERPL-MCNC: 203 UG/DL (ref 70–310)
TIBC SERPL-MCNC: 270 UG/DL (ref 250–450)
TRANSFERRIN SERPL-MCNC: 232 MG/DL (ref 180–382)
TRIGL SERPL-MCNC: 110 MG/DL (ref 37–140)
VLDLC SERPL CALC-MCNC: 22 MG/DL
WBC # BLD AUTO: 6.91 X10(3)/MCL (ref 4.5–11.5)

## 2024-12-03 PROCEDURE — 80061 LIPID PANEL: CPT

## 2024-12-03 PROCEDURE — 83550 IRON BINDING TEST: CPT

## 2024-12-03 PROCEDURE — 85025 COMPLETE CBC W/AUTO DIFF WBC: CPT

## 2024-12-03 PROCEDURE — 80053 COMPREHEN METABOLIC PANEL: CPT

## 2024-12-03 PROCEDURE — 36415 COLL VENOUS BLD VENIPUNCTURE: CPT

## 2024-12-03 PROCEDURE — 83036 HEMOGLOBIN GLYCOSYLATED A1C: CPT

## 2024-12-05 ENCOUNTER — OFFICE VISIT (OUTPATIENT)
Dept: FAMILY MEDICINE | Facility: CLINIC | Age: 44
End: 2024-12-05
Payer: COMMERCIAL

## 2024-12-05 VITALS
TEMPERATURE: 98 F | OXYGEN SATURATION: 97 % | HEART RATE: 80 BPM | HEIGHT: 60 IN | WEIGHT: 173.81 LBS | BODY MASS INDEX: 34.12 KG/M2 | DIASTOLIC BLOOD PRESSURE: 78 MMHG | RESPIRATION RATE: 14 BRPM | SYSTOLIC BLOOD PRESSURE: 120 MMHG

## 2024-12-05 DIAGNOSIS — D50.9 IRON DEFICIENCY ANEMIA, UNSPECIFIED IRON DEFICIENCY ANEMIA TYPE: ICD-10-CM

## 2024-12-05 DIAGNOSIS — E11.9 TYPE 2 DIABETES MELLITUS WITHOUT COMPLICATION, WITHOUT LONG-TERM CURRENT USE OF INSULIN: ICD-10-CM

## 2024-12-05 DIAGNOSIS — E78.2 MIXED HYPERLIPIDEMIA: ICD-10-CM

## 2024-12-05 DIAGNOSIS — I10 PRIMARY HYPERTENSION: Primary | ICD-10-CM

## 2024-12-05 DIAGNOSIS — E66.01 MORBID OBESITY: ICD-10-CM

## 2024-12-05 PROCEDURE — 3008F BODY MASS INDEX DOCD: CPT | Mod: CPTII,,, | Performed by: NURSE PRACTITIONER

## 2024-12-05 PROCEDURE — 3074F SYST BP LT 130 MM HG: CPT | Mod: CPTII,,, | Performed by: NURSE PRACTITIONER

## 2024-12-05 PROCEDURE — 1160F RVW MEDS BY RX/DR IN RCRD: CPT | Mod: CPTII,,, | Performed by: NURSE PRACTITIONER

## 2024-12-05 PROCEDURE — G2211 COMPLEX E/M VISIT ADD ON: HCPCS | Mod: ,,, | Performed by: NURSE PRACTITIONER

## 2024-12-05 PROCEDURE — 3078F DIAST BP <80 MM HG: CPT | Mod: CPTII,,, | Performed by: NURSE PRACTITIONER

## 2024-12-05 PROCEDURE — 3044F HG A1C LEVEL LT 7.0%: CPT | Mod: CPTII,,, | Performed by: NURSE PRACTITIONER

## 2024-12-05 PROCEDURE — 1159F MED LIST DOCD IN RCRD: CPT | Mod: CPTII,,, | Performed by: NURSE PRACTITIONER

## 2024-12-05 PROCEDURE — 99214 OFFICE O/P EST MOD 30 MIN: CPT | Mod: ,,, | Performed by: NURSE PRACTITIONER

## 2024-12-05 PROCEDURE — 4010F ACE/ARB THERAPY RXD/TAKEN: CPT | Mod: CPTII,,, | Performed by: NURSE PRACTITIONER

## 2024-12-05 PROCEDURE — 3066F NEPHROPATHY DOC TX: CPT | Mod: CPTII,,, | Performed by: NURSE PRACTITIONER

## 2024-12-05 PROCEDURE — 3061F NEG MICROALBUMINURIA REV: CPT | Mod: CPTII,,, | Performed by: NURSE PRACTITIONER

## 2024-12-05 NOTE — PROGRESS NOTES
Subjective:       Patient ID: Kathryn Scruggs is a 44 y.o. female.    Chief Complaint: Follow-up (3 mth)      HPI   This is a 44-year-old white female who presents to clinic today for a three-month follow-up for hypertension, hyperlipidemia, anemia, type 2 diabetes, morbid obesity.  Reports overall doing well.  Feels like weight kind of has plateaued with a 7.5 mg of Mounjaro blood sugars have been great.  Review of Systems  Comprehensive review of systems negative except as stated in HPI    The patient's Health Maintenance was reviewed and the following appears to be due:   There are no preventive care reminders to display for this patient.    Past Medical History:  Past Medical History:   Diagnosis Date    ADHD (attention deficit hyperactivity disorder)     Depression     Pre-diabetes      Past Surgical History:   Procedure Laterality Date     SECTION  2003     SECTION  2010     Review of patient's allergies indicates:   Allergen Reactions    Bacitracin Rash     rash      Cortisone Rash     rash    Dexamethasone Rash    Neomycin-bacitracin-polymyxin Rash     rash       Current Outpatient Medications on File Prior to Visit   Medication Sig Dispense Refill    amLODIPine (NORVASC) 5 MG tablet Take 1 tablet (5 mg total) by mouth once daily. 90 tablet 3    rosuvastatin (CRESTOR) 10 MG tablet Take 1 tablet (10 mg total) by mouth once daily. 90 tablet 3    [DISCONTINUED] MOUNJARO 7.5 mg/0.5 mL PnIj Inject 7.5 mg into the skin every 7 days. 2 mL 11     No current facility-administered medications on file prior to visit.     Social History     Socioeconomic History    Marital status:    Tobacco Use    Smoking status: Former     Current packs/day: 0.00     Types: Cigarettes     Start date:      Quit date: 2009     Years since quitting: 15.4    Smokeless tobacco: Never   Substance and Sexual Activity    Alcohol use: Not Currently     Comment: occasional    Drug use: Not Currently      Types: Marijuana    Sexual activity: Yes     Partners: Male     Social Drivers of Health     Financial Resource Strain: Low Risk  (12/4/2024)    Overall Financial Resource Strain (CARDIA)     Difficulty of Paying Living Expenses: Not hard at all   Food Insecurity: No Food Insecurity (12/4/2024)    Hunger Vital Sign     Worried About Running Out of Food in the Last Year: Never true     Ran Out of Food in the Last Year: Never true   Transportation Needs: No Transportation Needs (8/12/2024)    TRANSPORTATION NEEDS     Transportation : No   Physical Activity: Inactive (12/4/2024)    Exercise Vital Sign     Days of Exercise per Week: 0 days     Minutes of Exercise per Session: 0 min   Stress: No Stress Concern Present (12/4/2024)    [employer] [employer] - Occupational Stress Questionnaire     Feeling of Stress : Not at all   Housing Stability: Low Risk  (12/4/2024)    Housing Stability Vital Sign     Unable to Pay for Housing in the Last Year: No     Homeless in the Last Year: No     Family History   Problem Relation Name Age of Onset    No Known Problems Mother         Objective:       /78   Pulse 80   Temp 97.8 °F (36.6 °C) (Oral)   Resp 14   Ht 5' (1.524 m)   Wt 78.8 kg (173 lb 12.8 oz)   LMP 11/15/2024   SpO2 97%   BMI 33.94 kg/m²      Physical Exam  Vitals and nursing note reviewed.   Constitutional:       Appearance: Normal appearance. She is obese.   HENT:      Head: Normocephalic and atraumatic.      Right Ear: Tympanic membrane, ear canal and external ear normal.      Left Ear: Tympanic membrane, ear canal and external ear normal.      Nose: Nose normal.      Mouth/Throat:      Mouth: Mucous membranes are moist.      Pharynx: Oropharynx is clear.   Eyes:      Extraocular Movements: Extraocular movements intact.      Conjunctiva/sclera: Conjunctivae normal.      Pupils: Pupils are equal, round, and reactive to light.   Cardiovascular:      Rate and Rhythm: Normal rate and  regular rhythm.      Heart sounds: Normal heart sounds.   Pulmonary:      Effort: Pulmonary effort is normal.      Breath sounds: Normal breath sounds.   Musculoskeletal:         General: Normal range of motion.      Cervical back: Normal range of motion and neck supple.   Skin:     General: Skin is warm and dry.   Neurological:      General: No focal deficit present.      Mental Status: She is alert and oriented to person, place, and time.   Psychiatric:         Mood and Affect: Mood normal.         Behavior: Behavior normal.         Thought Content: Thought content normal.         Judgment: Judgment normal.         Labs  Lab Visit on 12/03/2024   Component Date Value Ref Range Status    Sodium 12/03/2024 135 (L)  136 - 145 mmol/L Final    Potassium 12/03/2024 4.4  3.5 - 5.1 mmol/L Final    Chloride 12/03/2024 104  98 - 107 mmol/L Final    CO2 12/03/2024 25  22 - 29 mmol/L Final    Glucose 12/03/2024 93  74 - 100 mg/dL Final    Blood Urea Nitrogen 12/03/2024 14.2  7.0 - 18.7 mg/dL Final    Creatinine 12/03/2024 0.73  0.55 - 1.02 mg/dL Final    Calcium 12/03/2024 9.3  8.4 - 10.2 mg/dL Final    Protein Total 12/03/2024 7.3  6.4 - 8.3 gm/dL Final    Albumin 12/03/2024 4.1  3.5 - 5.0 g/dL Final    Globulin 12/03/2024 3.2  2.4 - 3.5 gm/dL Final    Albumin/Globulin Ratio 12/03/2024 1.3  1.1 - 2.0 ratio Final    Bilirubin Total 12/03/2024 0.5  <=1.5 mg/dL Final    ALP 12/03/2024 59  40 - 150 unit/L Final    ALT 12/03/2024 14  0 - 55 unit/L Final    AST 12/03/2024 12  5 - 34 unit/L Final    eGFR 12/03/2024 >60  mL/min/1.73/m2 Final    Anion Gap 12/03/2024 6.0  mEq/L Final    BUN/Creatinine Ratio 12/03/2024 19   Final    Cholesterol Total 12/03/2024 158  <=200 mg/dL Final    HDL Cholesterol 12/03/2024 38  35 - 60 mg/dL Final    Triglyceride 12/03/2024 110  37 - 140 mg/dL Final    Cholesterol/HDL Ratio 12/03/2024 4  0 - 5 Final    Very Low Density Lipoprotein 12/03/2024 22   Final    LDL Cholesterol 12/03/2024 98.00  50.00 -  140.00 mg/dL Final    Hemoglobin A1c 12/03/2024 5.0  <=7.0 % Final    Estimated Average Glucose 12/03/2024 96.8  mg/dL Final    Iron Binding Capacity Unsaturated 12/03/2024 203  70 - 310 ug/dL Final    Iron Level 12/03/2024 67  50 - 170 ug/dL Final    Transferrin 12/03/2024 232  180 - 382 mg/dL Final    Iron Binding Capacity Total 12/03/2024 270  250 - 450 ug/dL Final    Iron Saturation 12/03/2024 25  20 - 50 % Final    WBC 12/03/2024 6.91  4.50 - 11.50 x10(3)/mcL Final    RBC 12/03/2024 5.07  4.20 - 5.40 x10(6)/mcL Final    Hgb 12/03/2024 14.0  12.0 - 16.0 g/dL Final    Hct 12/03/2024 42.1  37.0 - 47.0 % Final    MCV 12/03/2024 83.0  80.0 - 94.0 fL Final    MCH 12/03/2024 27.6  27.0 - 31.0 pg Final    MCHC 12/03/2024 33.3  33.0 - 36.0 g/dL Final    RDW 12/03/2024 14.6  11.5 - 17.0 % Final    Platelet 12/03/2024 245  130 - 400 x10(3)/mcL Final    MPV 12/03/2024 8.9  7.4 - 10.4 fL Final    Neut % 12/03/2024 62.5  % Final    Lymph % 12/03/2024 27.2  % Final    Mono % 12/03/2024 9.6  % Final    Eos % 12/03/2024 0.1  % Final    Basophil % 12/03/2024 0.3  % Final    Lymph # 12/03/2024 1.88  0.6 - 4.6 x10(3)/mcL Final    Neut # 12/03/2024 4.32  2.1 - 9.2 x10(3)/mcL Final    Mono # 12/03/2024 0.66  0.1 - 1.3 x10(3)/mcL Final    Eos # 12/03/2024 0.01  0 - 0.9 x10(3)/mcL Final    Baso # 12/03/2024 0.02  <=0.2 x10(3)/mcL Final    IG# 12/03/2024 0.02  0 - 0.04 x10(3)/mcL Final    IG% 12/03/2024 0.3  % Final   Lab Visit on 08/23/2024   Component Date Value Ref Range Status    Sodium 08/23/2024 136  136 - 145 mmol/L Final    Potassium 08/23/2024 4.0  3.5 - 5.1 mmol/L Final    Chloride 08/23/2024 104  98 - 107 mmol/L Final    CO2 08/23/2024 26  22 - 29 mmol/L Final    Glucose 08/23/2024 92  74 - 100 mg/dL Final    Blood Urea Nitrogen 08/23/2024 12.8  7.0 - 18.7 mg/dL Final    Creatinine 08/23/2024 0.83  0.55 - 1.02 mg/dL Final    Calcium 08/23/2024 9.1  8.4 - 10.2 mg/dL Final    Protein Total 08/23/2024 6.9  6.4 - 8.3 gm/dL Final     Albumin 08/23/2024 4.0  3.5 - 5.0 g/dL Final    Globulin 08/23/2024 2.9  2.4 - 3.5 gm/dL Final    Albumin/Globulin Ratio 08/23/2024 1.4  1.1 - 2.0 ratio Final    Bilirubin Total 08/23/2024 0.5  <=1.5 mg/dL Final    ALP 08/23/2024 58  40 - 150 unit/L Final    ALT 08/23/2024 9  0 - 55 unit/L Final    AST 08/23/2024 14  5 - 34 unit/L Final    eGFR 08/23/2024 >60  mL/min/1.73/m2 Final    Anion Gap 08/23/2024 6.0  mEq/L Final    BUN/Creatinine Ratio 08/23/2024 15   Final    Cholesterol Total 08/23/2024 219 (H)  <=200 mg/dL Final    HDL Cholesterol 08/23/2024 34 (L)  35 - 60 mg/dL Final    Triglyceride 08/23/2024 116  37 - 140 mg/dL Final    Cholesterol/HDL Ratio 08/23/2024 6 (H)  0 - 5 Final    Very Low Density Lipoprotein 08/23/2024 23   Final    LDL Cholesterol 08/23/2024 162.00 (H)  50.00 - 140.00 mg/dL Final    TSH 08/23/2024 1.984  0.350 - 4.940 uIU/mL Final    Hemoglobin A1c 08/23/2024 4.7  <=7.0 % Final    Estimated Average Glucose 08/23/2024 88.2  mg/dL Final    Urine Microalbumin 08/23/2024 9.0  <=30.0 ug/mL Final    Urine Creatinine 08/23/2024 150.1 (H)  45.0 - 106.0 mg/dL Final    Microalbumin Creatinine Ratio 08/23/2024 6.0  0.0 - 30.0 mg/gm Cr Final    Iron Binding Capacity Unsaturated 08/23/2024 265  70 - 310 ug/dL Final    Iron Level 08/23/2024 37 (L)  50 - 170 ug/dL Final    Transferrin 08/23/2024 285  180 - 382 mg/dL Final    Iron Binding Capacity Total 08/23/2024 302  250 - 450 ug/dL Final    Iron Saturation 08/23/2024 12 (L)  20 - 50 % Final    WBC 08/23/2024 6.42  4.50 - 11.50 x10(3)/mcL Final    RBC 08/23/2024 4.13 (L)  4.20 - 5.40 x10(6)/mcL Final    Hgb 08/23/2024 11.2 (L)  12.0 - 16.0 g/dL Final    Hct 08/23/2024 33.6 (L)  37.0 - 47.0 % Final    MCV 08/23/2024 81.4  80.0 - 94.0 fL Final    MCH 08/23/2024 27.1  27.0 - 31.0 pg Final    MCHC 08/23/2024 33.3  33.0 - 36.0 g/dL Final    RDW 08/23/2024 13.7  11.5 - 17.0 % Final    Platelet 08/23/2024 279  130 - 400 x10(3)/mcL Final    MPV 08/23/2024  9.1  7.4 - 10.4 fL Final    Neut % 08/23/2024 57.9  % Final    Lymph % 08/23/2024 33.8  % Final    Mono % 08/23/2024 7.2  % Final    Eos % 08/23/2024 0.3  % Final    Basophil % 08/23/2024 0.5  % Final    Lymph # 08/23/2024 2.17  0.6 - 4.6 x10(3)/mcL Final    Neut # 08/23/2024 3.72  2.1 - 9.2 x10(3)/mcL Final    Mono # 08/23/2024 0.46  0.1 - 1.3 x10(3)/mcL Final    Eos # 08/23/2024 0.02  0 - 0.9 x10(3)/mcL Final    Baso # 08/23/2024 0.03  <=0.2 x10(3)/mcL Final    IG# 08/23/2024 0.02  0 - 0.04 x10(3)/mcL Final    IG% 08/23/2024 0.3  % Final       Assessment and Plan       ICD-10-CM ICD-9-CM   1. Primary hypertension  I10 401.9   2. Mixed hyperlipidemia  E78.2 272.2   3. Iron deficiency anemia, unspecified iron deficiency anemia type  D50.9 280.9   4. Type 2 diabetes mellitus without complication, without long-term current use of insulin  E11.9 250.00   5. Morbid obesity  E66.01 278.01        1. Primary hypertension  Overview:  08/29/2023 - lisinopril 2.5 mg daily    06/06/2024 - discontinue lisinopril due to cough, start amlodipine 5 mg daily    Assessment & Plan:  Stable, continue amlodipine, follow-up 3 months.      2. Mixed hyperlipidemia  Overview:  09/04/2024 - rosuvastatin 10 mg daily    Assessment & Plan:  Significantly improved, total cholesterol 158, LDL 98.  Continue with rosuvastatin 10 mg daily, follow-up with repeat labs next August.      3. Iron deficiency anemia, unspecified iron deficiency anemia type  Assessment & Plan:  Improved, has not donated blood since last visit, H&H 14 and 42.1, iron 67, iron saturation 25%.      4. Type 2 diabetes mellitus without complication, without long-term current use of insulin  Overview:  Diagnosed age 42 (August 2023), Hgb A1c 7.2    08/29/2023 - Ozempic 0.25 mg SQ weekly    November 2023 - Ozempic 1 mg weekly    03/12/2024 - switch to Mounjaro 7.5 mg weekly     04/16/2024 - Mounjaro 10 mg weekly     06/05/2024 - Mounjaro 7.5 mg weekly    12/05/2024 - Mounjaro 10 mg  weekly    Assessment & Plan:  Increase Mounjaro to 10 mg weekly, follow-up 3 months.  Hemoglobin A1c great at 5.0.  Continue diabetic diet and lifestyle modification.    Orders:  -     tirzepatide 10 mg/0.5 mL PnIj; Inject 10 mg into the skin every 7 days.  Dispense: 2 mL; Refill: 11    5. Morbid obesity  Overview:  08/29/2023 - 220 lb, BMI 43.08, start Ozempic  11/30/2023 - 205 lb, BMI 40.11  03/01/2024 - 195 lb, BMI 38.20  06/06/2024 - 184 lb, BMI 35.94  09/04/2024 - 176 lb, BMI 34.37  12/05/2024 - 173 lb, BMI 33.94    Assessment & Plan:  Improving, will increase Mounjaro to 10 mg weekly, follow-up 3 months.             Follow up in about 3 months (around 3/5/2025) for follow up no labs.

## 2024-12-05 NOTE — ASSESSMENT & PLAN NOTE
Increase Mounjaro to 10 mg weekly, follow-up 3 months.  Hemoglobin A1c great at 5.0.  Continue diabetic diet and lifestyle modification.

## 2024-12-05 NOTE — ASSESSMENT & PLAN NOTE
Significantly improved, total cholesterol 158, LDL 98.  Continue with rosuvastatin 10 mg daily, follow-up with repeat labs next August.

## 2025-01-15 ENCOUNTER — PATIENT MESSAGE (OUTPATIENT)
Dept: FAMILY MEDICINE | Facility: CLINIC | Age: 45
End: 2025-01-15
Payer: COMMERCIAL

## 2025-01-15 DIAGNOSIS — E11.9 TYPE 2 DIABETES MELLITUS WITHOUT COMPLICATION, WITHOUT LONG-TERM CURRENT USE OF INSULIN: Primary | ICD-10-CM

## 2025-01-15 RX ORDER — SEMAGLUTIDE 1.34 MG/ML
1 INJECTION, SOLUTION SUBCUTANEOUS
Qty: 3 ML | Refills: 11 | Status: SHIPPED | OUTPATIENT
Start: 2025-01-15 | End: 2025-01-15

## 2025-01-26 ENCOUNTER — PATIENT MESSAGE (OUTPATIENT)
Dept: FAMILY MEDICINE | Facility: CLINIC | Age: 45
End: 2025-01-26
Payer: COMMERCIAL

## 2025-02-27 ENCOUNTER — PATIENT MESSAGE (OUTPATIENT)
Dept: FAMILY MEDICINE | Facility: CLINIC | Age: 45
End: 2025-02-27
Payer: COMMERCIAL

## 2025-03-07 ENCOUNTER — OFFICE VISIT (OUTPATIENT)
Dept: FAMILY MEDICINE | Facility: CLINIC | Age: 45
End: 2025-03-07
Payer: COMMERCIAL

## 2025-03-07 VITALS
SYSTOLIC BLOOD PRESSURE: 131 MMHG | DIASTOLIC BLOOD PRESSURE: 82 MMHG | HEART RATE: 81 BPM | WEIGHT: 170 LBS | RESPIRATION RATE: 16 BRPM | OXYGEN SATURATION: 99 % | BODY MASS INDEX: 33.38 KG/M2 | HEIGHT: 60 IN

## 2025-03-07 DIAGNOSIS — Z00.00 ANNUAL PHYSICAL EXAM: ICD-10-CM

## 2025-03-07 DIAGNOSIS — G47.33 OSA (OBSTRUCTIVE SLEEP APNEA): ICD-10-CM

## 2025-03-07 DIAGNOSIS — E66.811 CLASS 1 OBESITY DUE TO EXCESS CALORIES WITH SERIOUS COMORBIDITY AND BODY MASS INDEX (BMI) OF 33.0 TO 33.9 IN ADULT: ICD-10-CM

## 2025-03-07 DIAGNOSIS — E78.2 MIXED HYPERLIPIDEMIA: ICD-10-CM

## 2025-03-07 DIAGNOSIS — Z12.31 BREAST CANCER SCREENING BY MAMMOGRAM: ICD-10-CM

## 2025-03-07 DIAGNOSIS — E11.9 TYPE 2 DIABETES MELLITUS WITHOUT COMPLICATION, WITHOUT LONG-TERM CURRENT USE OF INSULIN: Primary | ICD-10-CM

## 2025-03-07 DIAGNOSIS — E66.09 CLASS 1 OBESITY DUE TO EXCESS CALORIES WITH SERIOUS COMORBIDITY AND BODY MASS INDEX (BMI) OF 33.0 TO 33.9 IN ADULT: ICD-10-CM

## 2025-03-07 DIAGNOSIS — D50.9 IRON DEFICIENCY ANEMIA, UNSPECIFIED IRON DEFICIENCY ANEMIA TYPE: ICD-10-CM

## 2025-03-07 DIAGNOSIS — I10 PRIMARY HYPERTENSION: ICD-10-CM

## 2025-03-07 RX ORDER — ROSUVASTATIN CALCIUM 5 MG/1
5 TABLET, COATED ORAL DAILY
Qty: 90 TABLET | Refills: 3 | Status: SHIPPED | OUTPATIENT
Start: 2025-03-07 | End: 2026-03-07

## 2025-03-07 NOTE — PROGRESS NOTES
Subjective:       Patient ID: Kathryn Scruggs is a 44 y.o. female.    Chief Complaint: Diabetes (3m fu), Obesity (3m fu), Hypertension (3m fu), and Sleep Apnea (3m fu)      History of Present Illness    CHIEF COMPLAINT:  Patient presents today for follow up of diabetes, obesity, blood pressure, and cholesterol.    WEIGHT MANAGEMENT:  She reports previous successful weight loss but is currently plateaued at 170 lbs. She expresses frustration with the lack of continued progress despite previous success.    MEDICATIONS:  She discontinued cholesterol medication and Amlodipine (previously taken for blood pressure) after weight loss led to improved blood pressure. She currently takes Mounjaro 10mg for about two months and OTC iron supplements. She expresses concerns about cholesterol medications and inquires about natural alternatives for management.    IRON DEFICIENCY:  She previously received iron infusions and has stopped donating blood. She reports improved energy levels this week, which she attributes to iron treatment.        Review of Systems  Comprehensive review of systems negative except as stated in HPI    The patient's Health Maintenance was reviewed and the following appears to be due:   There are no preventive care reminders to display for this patient.    Past Medical History:  Past Medical History:   Diagnosis Date    ADHD (attention deficit hyperactivity disorder)     Depression     Pre-diabetes      Past Surgical History:   Procedure Laterality Date     SECTION  2003     SECTION  2010     Review of patient's allergies indicates:   Allergen Reactions    Bacitracin Rash     rash      Cortisone Rash     rash    Dexamethasone Rash    Neomycin-bacitracin-polymyxin Rash     rash       Medications Ordered Prior to Encounter[1]  Social History[2]  Family History   Problem Relation Name Age of Onset    No Known Problems Mother         Objective:       /82 (BP Location: Left arm)    Pulse 81   Resp 16   Ht 5' (1.524 m)   Wt 77.1 kg (170 lb)   LMP 03/04/2025   SpO2 99%   BMI 33.20 kg/m²      Physical Exam  Vitals and nursing note reviewed.   Constitutional:       Appearance: Normal appearance.   HENT:      Head: Normocephalic and atraumatic.      Right Ear: Tympanic membrane, ear canal and external ear normal.      Left Ear: Tympanic membrane, ear canal and external ear normal.      Nose: Nose normal.      Mouth/Throat:      Mouth: Mucous membranes are moist.      Pharynx: Oropharynx is clear.   Eyes:      Extraocular Movements: Extraocular movements intact.      Conjunctiva/sclera: Conjunctivae normal.      Pupils: Pupils are equal, round, and reactive to light.   Cardiovascular:      Rate and Rhythm: Normal rate and regular rhythm.      Heart sounds: Normal heart sounds.   Pulmonary:      Effort: Pulmonary effort is normal.      Breath sounds: Normal breath sounds.   Musculoskeletal:         General: Normal range of motion.      Cervical back: Normal range of motion and neck supple.   Skin:     General: Skin is warm and dry.   Neurological:      General: No focal deficit present.      Mental Status: She is alert and oriented to person, place, and time.   Psychiatric:         Mood and Affect: Mood normal.         Behavior: Behavior normal.         Thought Content: Thought content normal.         Judgment: Judgment normal.         Labs  Lab Visit on 12/03/2024   Component Date Value Ref Range Status    Sodium 12/03/2024 135 (L)  136 - 145 mmol/L Final    Potassium 12/03/2024 4.4  3.5 - 5.1 mmol/L Final    Chloride 12/03/2024 104  98 - 107 mmol/L Final    CO2 12/03/2024 25  22 - 29 mmol/L Final    Glucose 12/03/2024 93  74 - 100 mg/dL Final    Blood Urea Nitrogen 12/03/2024 14.2  7.0 - 18.7 mg/dL Final    Creatinine 12/03/2024 0.73  0.55 - 1.02 mg/dL Final    Calcium 12/03/2024 9.3  8.4 - 10.2 mg/dL Final    Protein Total 12/03/2024 7.3  6.4 - 8.3 gm/dL Final    Albumin 12/03/2024 4.1   3.5 - 5.0 g/dL Final    Globulin 12/03/2024 3.2  2.4 - 3.5 gm/dL Final    Albumin/Globulin Ratio 12/03/2024 1.3  1.1 - 2.0 ratio Final    Bilirubin Total 12/03/2024 0.5  <=1.5 mg/dL Final    ALP 12/03/2024 59  40 - 150 unit/L Final    ALT 12/03/2024 14  0 - 55 unit/L Final    AST 12/03/2024 12  5 - 34 unit/L Final    eGFR 12/03/2024 >60  mL/min/1.73/m2 Final    Anion Gap 12/03/2024 6.0  mEq/L Final    BUN/Creatinine Ratio 12/03/2024 19   Final    Cholesterol Total 12/03/2024 158  <=200 mg/dL Final    HDL Cholesterol 12/03/2024 38  35 - 60 mg/dL Final    Triglyceride 12/03/2024 110  37 - 140 mg/dL Final    Cholesterol/HDL Ratio 12/03/2024 4  0 - 5 Final    Very Low Density Lipoprotein 12/03/2024 22   Final    LDL Cholesterol 12/03/2024 98.00  50.00 - 140.00 mg/dL Final    Hemoglobin A1c 12/03/2024 5.0  <=7.0 % Final    Estimated Average Glucose 12/03/2024 96.8  mg/dL Final    Iron Binding Capacity Unsaturated 12/03/2024 203  70 - 310 ug/dL Final    Iron Level 12/03/2024 67  50 - 170 ug/dL Final    Transferrin 12/03/2024 232  180 - 382 mg/dL Final    Iron Binding Capacity Total 12/03/2024 270  250 - 450 ug/dL Final    Iron Saturation 12/03/2024 25  20 - 50 % Final    WBC 12/03/2024 6.91  4.50 - 11.50 x10(3)/mcL Final    RBC 12/03/2024 5.07  4.20 - 5.40 x10(6)/mcL Final    Hgb 12/03/2024 14.0  12.0 - 16.0 g/dL Final    Hct 12/03/2024 42.1  37.0 - 47.0 % Final    MCV 12/03/2024 83.0  80.0 - 94.0 fL Final    MCH 12/03/2024 27.6  27.0 - 31.0 pg Final    MCHC 12/03/2024 33.3  33.0 - 36.0 g/dL Final    RDW 12/03/2024 14.6  11.5 - 17.0 % Final    Platelet 12/03/2024 245  130 - 400 x10(3)/mcL Final    MPV 12/03/2024 8.9  7.4 - 10.4 fL Final    Neut % 12/03/2024 62.5  % Final    Lymph % 12/03/2024 27.2  % Final    Mono % 12/03/2024 9.6  % Final    Eos % 12/03/2024 0.1  % Final    Basophil % 12/03/2024 0.3  % Final    Lymph # 12/03/2024 1.88  0.6 - 4.6 x10(3)/mcL Final    Neut # 12/03/2024 4.32  2.1 - 9.2 x10(3)/mcL Final     Mono # 12/03/2024 0.66  0.1 - 1.3 x10(3)/mcL Final    Eos # 12/03/2024 0.01  0 - 0.9 x10(3)/mcL Final    Baso # 12/03/2024 0.02  <=0.2 x10(3)/mcL Final    Imm Gran # 12/03/2024 0.02  0 - 0.04 x10(3)/mcL Final    Imm Grans % 12/03/2024 0.3  % Final       Assessment and Plan     Assessment & Plan    E11.9 Type 2 diabetes mellitus without complication, without long-term current use of insulin  E66.811, E66.09, Z68.33 Class 1 obesity due to excess calories with serious comorbidity and body mass index (BMI) of 33.0 to 33.9 in adult  I10 Primary hypertension  Z00.00 Annual physical exam  D50.9 Iron deficiency anemia, unspecified iron deficiency anemia type  Z12.31 Breast cancer screening by mammogram    IMPRESSION:  - Reassessed medication regimen in light of recent weight loss and improved blood pressure  - Determined Amlodipine no longer required for blood pressure management  - Will restart statin therapy at a lower dose for cardiovascular protection, despite patient's reservations, due to increased cardiovascular risk associated with diabetes  - Evaluated iron levels and determined patient can continue OTC iron supplementation    E11.9 TYPE 2 DIABETES MELLITUS WITHOUT COMPLICATION, WITHOUT LONG-TERM CURRENT USE OF INSULIN:  - Emphasized the importance of statin therapy for patients with diabetes, emphasizing its role in reducing cardiovascular risk rather than solely managing cholesterol levels.  - Clarified that diabetes inherently increases the risk of heart attack and stroke.  - Restarted statin therapy with rosuvastatin at 5 mg (reduced from previous 10 mg dose).  - continued Mounjaro 10 mg weekly, with potential for further increase.  - Scheduled follow-up visit for diabetes management.  - Noted patient's weight is 170 lbs.  - Observed that labs was excellent during the last check.  - Noted that patient's weight loss has plateaued.  - Scheduled next lab check in 6 months.  - Advised continuation of OTC iron  supplementation.    I10 PRIMARY HYPERTENSION:  - Discontinued Amlodipine due to improved blood pressure following weight loss.    Z00.00 ANNUAL PHYSICAL EXAM:  - Scheduled follow-up visit in 6 months.  - Instructed the patient to contact the office if medication dose changes are needed before next appointment.    D50.9 IRON DEFICIENCY ANEMIA, UNSPECIFIED IRON DEFICIENCY ANEMIA TYPE:  - Advised continuation of OTC iron supplementation.  - Ordered labs including iron panels to be completed in 6 months.    Z12.31 BREAST CANCER SCREENING BY MAMMOGRAM:  - Ordered mammogram to be completed in 6 months.  - Instructed the patient to use the dustin to schedule mammogram appointment.           1. Type 2 diabetes mellitus without complication, without long-term current use of insulin  Overview:  Diagnosed age 42 (August 2023), Hgb A1c 7.2    08/29/2023 - Ozempic 0.25 mg SQ weekly    November 2023 - Ozempic 1 mg weekly    03/12/2024 - switch to Mounjaro 7.5 mg weekly     04/16/2024 - Mounjaro 10 mg weekly     06/05/2024 - Mounjaro 7.5 mg weekly    12/05/2024 - Mounjaro 10 mg weekly    Orders:  -     Hemoglobin A1C; Future; Expected date: 08/27/2025  -     Microalbumin/Creatinine Ratio, Urine; Future; Expected date: 08/27/2025  -     rosuvastatin (CRESTOR) 5 MG tablet; Take 1 tablet (5 mg total) by mouth once daily.  Dispense: 90 tablet; Refill: 3    2. Class 1 obesity due to excess calories with serious comorbidity and body mass index (BMI) of 33.0 to 33.9 in adult  Overview:  08/29/2023 - 220 lb, BMI 43.08, start Ozempic  11/30/2023 - 205 lb, BMI 40.11  03/01/2024 - 195 lb, BMI 38.20  06/06/2024 - 184 lb, BMI 35.94  09/04/2024 - 176 lb, BMI 34.37  12/05/2024 - 173 lb, BMI 33.94  03/07/2025 - 170 lb, BMI 33.20      3. Primary hypertension  Overview:  08/29/2023 - lisinopril 2.5 mg daily    06/06/2024 - discontinue lisinopril due to cough, start amlodipine 5 mg daily    01/2025 - discontinued amlodipine due to weight loss      4.  PAUL (obstructive sleep apnea)  Overview:  Home sleep study completed with [facility] 08/22/2023  AHI 25.2  Min SpO2 89%  AutoPap 5-20 cmH20 ([facility])      5. Annual physical exam  Overview:  Annual exam yearly in September    Orders:  -     CBC Auto Differential; Future; Expected date: 08/27/2025  -     Comprehensive Metabolic Panel; Future; Expected date: 08/27/2025  -     Lipid Panel; Future; Expected date: 08/27/2025  -     TSH; Future; Expected date: 08/27/2025    6. Iron deficiency anemia, unspecified iron deficiency anemia type  -     Iron and TIBC; Future; Expected date: 08/27/2025    7. Breast cancer screening by mammogram  Overview:  MMG yearly in August    Orders:  -     Mammo Digital Screening Bilat w/ Jason (XPD); Future; Expected date: 08/27/2025    8. Mixed hyperlipidemia  Overview:  09/04/2024 - rosuvastatin 10 mg daily  03/07/2025 - rosuvastatin 5 mg daily             Follow up in 26 weeks (on 9/5/2025) for Annual.     This note was generated with the assistance of ambient listening technology. Verbal consent was obtained by the patient and accompanying visitor(s) for the recording of patient appointment to facilitate this note. I attest to having reviewed and edited the generated note for accuracy, though some syntax or spelling errors may persist. Please contact the author of this note for any clarification.            [1]   Current Outpatient Medications on File Prior to Visit   Medication Sig Dispense Refill    tirzepatide 10 mg/0.5 mL PnIj Inject 10 mg into the skin every 7 days. 2 mL 11    [DISCONTINUED] amLODIPine (NORVASC) 5 MG tablet Take 1 tablet (5 mg total) by mouth once daily. (Patient not taking: Reported on 3/7/2025) 90 tablet 3    [DISCONTINUED] rosuvastatin (CRESTOR) 10 MG tablet Take 1 tablet (10 mg total) by mouth once daily. (Patient not taking: Reported on 3/7/2025) 90 tablet 3     No current facility-administered medications on file prior to visit.   [2]   Social  History  Socioeconomic History    Marital status:    Tobacco Use    Smoking status: Former     Current packs/day: 0.00     Types: Cigarettes     Start date: 2009     Quit date: 7/1/2009     Years since quitting: 15.6    Smokeless tobacco: Never   Substance and Sexual Activity    Alcohol use: Not Currently     Comment: occasional    Drug use: Not Currently     Types: Marijuana    Sexual activity: Yes     Partners: Male     Social Drivers of Health     Financial Resource Strain: Low Risk  (2/28/2025)    Overall Financial Resource Strain (CARDIA)     Difficulty of Paying Living Expenses: Not hard at all   Food Insecurity: No Food Insecurity (2/28/2025)    Hunger Vital Sign     Worried About Running Out of Food in the Last Year: Never true     Ran Out of Food in the Last Year: Never true   Transportation Needs: No Transportation Needs (2/28/2025)    PRAPARE - Transportation     Lack of Transportation (Medical): No     Lack of Transportation (Non-Medical): No   Physical Activity: Sufficiently Active (2/28/2025)    Exercise Vital Sign     Days of Exercise per Week: 4 days     Minutes of Exercise per Session: 50 min   Recent Concern: Physical Activity - Inactive (12/4/2024)    Exercise Vital Sign     Days of Exercise per Week: 0 days     Minutes of Exercise per Session: 0 min   Stress: No Stress Concern Present (2/28/2025)    [employer] - Occupational Stress Questionnaire     Feeling of Stress : Not at all   Housing Stability: Low Risk  (2/28/2025)    Housing Stability Vital Sign     Unable to Pay for Housing in the Last Year: No     Number of Times Moved in the Last Year: 0     Homeless in the Last Year: No

## 2025-06-13 ENCOUNTER — TELEPHONE (OUTPATIENT)
Dept: INTERNAL MEDICINE | Facility: CLINIC | Age: 45
End: 2025-06-13
Payer: COMMERCIAL

## 2025-06-13 NOTE — TELEPHONE ENCOUNTER
[facility]/Population Health Chart Review & Patient Outreach Details For Medication Adherence Project    Reason for Outreach Encounter: 3rd Party payor non-compliance report ([employer], [employer], [employer], etc)  2.  Patient Outreach Method: Reviewed patient chart   3.   Medication in question:    Hyperlipidemia Medications              rosuvastatin (CRESTOR) 5 MG tablet Take 1 tablet (5 mg total) by mouth once daily.                    last filled  5/13/25 for 30 day supply      4.  Reviewed and or Updates Made To: Patient Chart  5. Outreach Outcomes and/or actions taken: Patient filled medication and is on track to be adherent  Additional Notes:

## 2025-07-03 ENCOUNTER — TELEPHONE (OUTPATIENT)
Dept: PHARMACY | Facility: CLINIC | Age: 45
End: 2025-07-03
Payer: COMMERCIAL

## 2025-07-04 NOTE — TELEPHONE ENCOUNTER
[facility]/Population Health Chart Review & Patient Outreach Details For Medication Adherence Project    Reason for Outreach Encounter: 3rd Party payor non-compliance report ([employer], [employer], [employer], etc)  2.  Patient Outreach Method: Reviewed patient chart   3.   Medication in question:    Hyperlipidemia Medications              rosuvastatin (CRESTOR) 5 MG tablet Take 1 tablet (5 mg total) by mouth once daily.                  rosuvastatin  last filled  6/23/25 for 30 day supply      4.  Reviewed and or Updates Made To: Patient Chart  5. Outreach Outcomes and/or actions taken: Patient filled medication and is on track to be adherent  Additional Notes:

## 2025-08-01 ENCOUNTER — TELEPHONE (OUTPATIENT)
Dept: PHARMACY | Facility: CLINIC | Age: 45
End: 2025-08-01
Payer: COMMERCIAL

## 2025-08-01 NOTE — TELEPHONE ENCOUNTER
[facility]/Population [facility] Chart Review & Patient Outreach Details For Medication Adherence Project    Reason for Outreach Encounter: 3rd Party payor non-compliance report ([employer], [employer], [employer], etc)  2.  Patient Outreach Method: Reviewed patient chart   3.   Medication in question:    Hyperlipidemia Medications              rosuvastatin (CRESTOR) 5 MG tablet Take 1 tablet (5 mg total) by mouth once daily.                  rosuvastatin  last filled  7/25/25 for 30 day supply      4.  Reviewed and or Updates Made To: Patient Chart  5. Outreach Outcomes and/or actions taken: Patient filled medication and is on track to be adherent  Additional Notes:

## 2025-08-27 ENCOUNTER — TELEPHONE (OUTPATIENT)
Dept: FAMILY MEDICINE | Facility: CLINIC | Age: 45
End: 2025-08-27
Payer: COMMERCIAL

## 2025-08-27 ENCOUNTER — HOSPITAL ENCOUNTER (OUTPATIENT)
Dept: RADIOLOGY | Facility: HOSPITAL | Age: 45
Discharge: HOME | End: 2025-08-27
Attending: NURSE PRACTITIONER
Payer: COMMERCIAL

## 2025-08-27 DIAGNOSIS — Z12.31 BREAST CANCER SCREENING BY MAMMOGRAM: ICD-10-CM

## 2025-08-27 PROCEDURE — 77063 BREAST TOMOSYNTHESIS BI: CPT | Mod: TC

## 2025-09-05 ENCOUNTER — OFFICE VISIT (OUTPATIENT)
Dept: FAMILY MEDICINE | Facility: CLINIC | Age: 45
End: 2025-09-05
Payer: COMMERCIAL

## 2025-09-05 ENCOUNTER — CLINICAL SUPPORT (OUTPATIENT)
Dept: FAMILY MEDICINE | Facility: CLINIC | Age: 45
End: 2025-09-05
Attending: NURSE PRACTITIONER
Payer: COMMERCIAL

## 2025-09-05 VITALS
WEIGHT: 163.81 LBS | RESPIRATION RATE: 16 BRPM | HEART RATE: 71 BPM | OXYGEN SATURATION: 98 % | DIASTOLIC BLOOD PRESSURE: 85 MMHG | SYSTOLIC BLOOD PRESSURE: 120 MMHG | BODY MASS INDEX: 32.16 KG/M2 | HEIGHT: 60 IN

## 2025-09-05 DIAGNOSIS — E11.9 TYPE 2 DIABETES MELLITUS WITHOUT COMPLICATION, WITHOUT LONG-TERM CURRENT USE OF INSULIN: ICD-10-CM

## 2025-09-05 DIAGNOSIS — E66.811 CLASS 1 OBESITY DUE TO EXCESS CALORIES WITH SERIOUS COMORBIDITY AND BODY MASS INDEX (BMI) OF 31.0 TO 31.9 IN ADULT: ICD-10-CM

## 2025-09-05 DIAGNOSIS — E66.09 CLASS 1 OBESITY DUE TO EXCESS CALORIES WITH SERIOUS COMORBIDITY AND BODY MASS INDEX (BMI) OF 31.0 TO 31.9 IN ADULT: ICD-10-CM

## 2025-09-05 DIAGNOSIS — E11.9 TYPE 2 DIABETES MELLITUS WITHOUT COMPLICATION, WITHOUT LONG-TERM CURRENT USE OF INSULIN: Primary | ICD-10-CM

## 2025-09-05 DIAGNOSIS — D50.9 IRON DEFICIENCY ANEMIA, UNSPECIFIED IRON DEFICIENCY ANEMIA TYPE: ICD-10-CM

## 2025-09-05 DIAGNOSIS — E78.2 MIXED HYPERLIPIDEMIA: ICD-10-CM

## 2025-09-05 DIAGNOSIS — Z12.31 BREAST CANCER SCREENING BY MAMMOGRAM: ICD-10-CM

## 2025-09-05 DIAGNOSIS — R11.0 NAUSEA: ICD-10-CM

## 2025-09-05 DIAGNOSIS — I10 PRIMARY HYPERTENSION: ICD-10-CM

## 2025-09-05 DIAGNOSIS — Z00.00 ANNUAL PHYSICAL EXAM: Primary | ICD-10-CM

## 2025-09-05 RX ORDER — TIRZEPATIDE 12.5 MG/.5ML
12.5 INJECTION, SOLUTION SUBCUTANEOUS
Qty: 2 ML | Refills: 11 | Status: SHIPPED | OUTPATIENT
Start: 2025-09-05

## 2025-09-05 RX ORDER — ONDANSETRON 8 MG/1
8 TABLET, ORALLY DISINTEGRATING ORAL EVERY 6 HOURS PRN
Qty: 30 TABLET | Refills: 11 | Status: SHIPPED | OUTPATIENT
Start: 2025-09-05